# Patient Record
Sex: MALE | Race: WHITE | NOT HISPANIC OR LATINO | Employment: OTHER | URBAN - METROPOLITAN AREA
[De-identification: names, ages, dates, MRNs, and addresses within clinical notes are randomized per-mention and may not be internally consistent; named-entity substitution may affect disease eponyms.]

---

## 2019-11-22 ENCOUNTER — TELEPHONE (OUTPATIENT)
Dept: OPHTHALMOLOGY | Facility: MEDICAL CENTER | Age: 74
End: 2019-11-22

## 2019-12-03 ENCOUNTER — APPOINTMENT (OUTPATIENT)
Dept: OPHTHALMOLOGY | Facility: MEDICAL CENTER | Age: 74
End: 2019-12-03
Payer: MEDICARE

## 2020-02-10 ENCOUNTER — OFFICE VISIT (OUTPATIENT)
Dept: OPHTHALMOLOGY | Facility: MEDICAL CENTER | Age: 75
End: 2020-02-10
Payer: MEDICARE

## 2020-02-10 DIAGNOSIS — H46.8 ISCHEMIC OPTIC NEURITIS OF LEFT EYE: ICD-10-CM

## 2020-02-10 DIAGNOSIS — H35.3123 ADVANCED ATROPHIC NONEXUDATIVE AGE-RELATED MACULAR DEGENERATION OF LEFT EYE WITHOUT SUBFOVEAL INVOLVEMENT: ICD-10-CM

## 2020-02-10 DIAGNOSIS — L57.0 ACTINIC KERATOSIS: ICD-10-CM

## 2020-02-10 DIAGNOSIS — G56.03 BILATERAL CARPAL TUNNEL SYNDROME: ICD-10-CM

## 2020-02-10 PROBLEM — H35.30 MACULAR DEGENERATION OF LEFT EYE: Status: ACTIVE | Noted: 2020-02-10

## 2020-02-10 PROCEDURE — 99204 OFFICE O/P NEW MOD 45 MIN: CPT | Mod: 25 | Performed by: OPHTHALMOLOGY

## 2020-02-10 PROCEDURE — 92250 FUNDUS PHOTOGRAPHY W/I&R: CPT | Performed by: OPHTHALMOLOGY

## 2020-02-10 RX ORDER — CARVEDILOL 12.5 MG/1
12.5 TABLET ORAL 2 TIMES DAILY WITH MEALS
COMMUNITY

## 2020-02-10 RX ORDER — SPIRONOLACTONE 25 MG/1
15 TABLET ORAL DAILY
COMMUNITY

## 2020-02-10 RX ORDER — IPRATROPIUM BROMIDE AND ALBUTEROL SULFATE 2.5; .5 MG/3ML; MG/3ML
3 SOLUTION RESPIRATORY (INHALATION) 4 TIMES DAILY
COMMUNITY

## 2020-02-10 RX ORDER — FUROSEMIDE 40 MG/1
40 TABLET ORAL 2 TIMES DAILY
COMMUNITY

## 2020-02-10 RX ORDER — LOSARTAN POTASSIUM 25 MG/1
25 TABLET ORAL DAILY
COMMUNITY

## 2020-02-10 RX ORDER — PRAVASTATIN SODIUM 40 MG
40 TABLET ORAL NIGHTLY
COMMUNITY

## 2020-02-10 SDOH — HEALTH STABILITY: MENTAL HEALTH: HOW OFTEN DO YOU HAVE A DRINK CONTAINING ALCOHOL?: 2-4 TIMES A MONTH

## 2020-02-10 ASSESSMENT — ENCOUNTER SYMPTOMS
CONSTITUTIONAL NEGATIVE: 1
DOUBLE VISION: 1
GASTROINTESTINAL NEGATIVE: 1
PSYCHIATRIC NEGATIVE: 1
NEUROLOGICAL NEGATIVE: 1
BLURRED VISION: 1

## 2020-02-10 ASSESSMENT — REFRACTION_MANIFEST
OS_SPHERE: PLANO
OD_CYLINDER: +0.75
OS_CYLINDER: +0.50
OS_AXIS: 091
METHOD_AUTOREFRACTION: 1
OD_SPHERE: -0.50
OD_AXIS: 162

## 2020-02-10 ASSESSMENT — VISUAL ACUITY
OS_CC: 20/200
CORRECTION_TYPE: GLASSES
METHOD: SNELLEN - LINEAR
OS_CC: J16
OD_PH_CC: 20/25
OD_CC: 20/30-2
OD_CC: J1+

## 2020-02-10 ASSESSMENT — CONF VISUAL FIELD
OS_INFERIOR_NASAL_RESTRICTION: 1
OS_INFERIOR_TEMPORAL_RESTRICTION: 1
OS_SUPERIOR_TEMPORAL_RESTRICTION: 1
OS_SUPERIOR_NASAL_RESTRICTION: 1
OD_NORMAL: 1

## 2020-02-10 ASSESSMENT — REFRACTION_WEARINGRX
OS_AXIS: 108
OD_CYLINDER: +0.50
OD_AXIS: 175
SPECS_TYPE: BIFOCAL
OS_CYLINDER: +0.50
OS_ADD: +2.50
OD_ADD: +2.50
OD_SPHERE: +0.75
OS_SPHERE: +1.00

## 2020-02-10 ASSESSMENT — SLIT LAMP EXAM - LIDS
COMMENTS: NORMAL
COMMENTS: NORMAL

## 2020-02-10 ASSESSMENT — REFRACTION
OD_CYLINDER: +0.75
OS_SPHERE: PLANO
OS_AXIS: 070
OD_SPHERE: -0.50
OD_AXIS: 170
OS_CYLINDER: +0.50

## 2020-02-10 ASSESSMENT — TONOMETRY
OS_IOP_MMHG: 16
IOP_METHOD: APPLANATION
OD_IOP_MMHG: 17

## 2020-02-10 ASSESSMENT — CUP TO DISC RATIO
OS_RATIO: 0.2
OD_RATIO: 0.3

## 2020-02-10 ASSESSMENT — EXTERNAL EXAM - LEFT EYE: OS_EXAM: NORMAL

## 2020-02-10 ASSESSMENT — EXTERNAL EXAM - RIGHT EYE: OD_EXAM: NORMAL

## 2020-02-10 NOTE — ASSESSMENT & PLAN NOTE
2/10/2020 - Most likely ischemic optic neuropathy given history of disc swelling and now atrophy with decrease NFL thickness to 66 OS. However not typical disc at risk and concern would be another acute process involving the optic nerve that my have lead to some swelling such as an expanding adenoma or aneurysm. No GCA symptoms. Also could be inflammatory / infectious. Will therefore send not to Dr Mckenna in [place] to obtain JOSE, RF, ACE, TPA (FTA-ABS), Bartonella, CBC Metabolic profile as well as MRI of the orbits with idalia. I would like to review the labs and then scans when available so they should send scan on CD as well for my review.  Gave information regarding NAAION to read at home and advised to stop smoking as the major modifiable risk factor.

## 2020-02-10 NOTE — PROGRESS NOTES
Peds/Neuro Ophthalmology:   Ben Mccray M.D.    Date & Time note created:    2/10/2020   4:58 PM     Referring MD / APRN:  Jerardo Mckenna M.D., [place]    Patient ID:  Name:             Derek Delgado   YOB: 1945  Age:                 75 y.o.  male   MRN:               [id number]    Chief Complaint/Reason for Visit:     Diplopia      History of Present Illness:    Derek Delgado is a 75 y.o. male   Referred for decreased vision possible optic nerve left eye since November of 2019.Flashes of light,double vision at times.Shadoe temporal area  Of  Left eye,floaters left.Everything ok with right eye except fluttering blue light only sometime.No eye pain or discharge. Also having weakness and sensitivity in the hands. Sometime during the day. Then saw Dr Sykes  11/19/2019 - Vision was 20/80 OS Noted blurred disc margin OS with elevation. No  MRI scans. Bp has been pretty low. Takes prescription meds at supper time. Still smoking. On xeralto since last April.       Review of Systems:  Review of Systems   Constitutional: Negative.    HENT: Positive for hearing loss.    Eyes: Positive for blurred vision and double vision.        Flashes and floaters   Gastrointestinal: Negative.    Genitourinary: Negative.    Musculoskeletal:        Finger pain   Skin: Negative.    Neurological: Negative.    Endo/Heme/Allergies: Negative.    Psychiatric/Behavioral: Negative.        Past Medical History:   Past Medical History:   Diagnosis Date   • CHF (congestive heart failure) ([facility])    • COPD (chronic obstructive pulmonary disease) ([facility])    • Hyperlipidemia        Past Surgical History:  Past Surgical History:   Procedure Laterality Date   • ROTATOR CUFF REPAIR Left        Current Outpatient Medications:  Current Outpatient Medications   Medication Sig Dispense Refill   • carvedilol (COREG) 12.5 MG Tab Take 12.5 mg by mouth 2 times a day, with meals.     • furosemide (LASIX) 40 MG Tab Take 40 mg by mouth every  day.     • ipratropium-albuterol (DUONEB) 0.5-2.5 (3) MG/3ML nebulizer solution 3 mL by Nebulization route 4 times a day.     • losartan (COZAAR) 25 MG Tab Take 25 mg by mouth every day.     • pravastatin (PRAVACHOL) 40 MG tablet Take 40 mg by mouth every evening.     • spironolactone (ALDACTONE) 25 MG Tab Take 25 mg by mouth every day.     • rivaroxaban (XARELTO) 20 MG Tab tablet Take 20 mg by mouth with dinner.     • fluticasone-salmeterol (ADVAIR) 500-50 MCG/DOSE AEROSOL POWDER, BREATH ACTIVATED Inhale 1 Puff by mouth every 12 hours.       No current facility-administered medications for this visit.        Allergies:  No Known Allergies    Family History:  Family History   Problem Relation Age of Onset   • Stroke Mother    • Glasses Mother    • Cancer Father    • Glasses Father        Social History:  Social History     Socioeconomic History   • Marital status:      Spouse name: Not on file   • Number of children: Not on file   • Years of education: Not on file   • Highest education level: Not on file   Occupational History   • Not on file   Social Needs   • Financial resource strain: Not on file   • Food insecurity:     Worry: Not on file     Inability: Not on file   • Transportation needs:     Medical: Not on file     Non-medical: Not on file   Tobacco Use   • Smoking status: Current Every Day Smoker     Packs/day: 0.00   • Smokeless tobacco: Never Used   Substance and Sexual Activity   • Alcohol use: Yes     Frequency: 2-4 times a month   • Drug use: Not on file   • Sexual activity: Not on file   Lifestyle   • Physical activity:     Days per week: Not on file     Minutes per session: Not on file   • Stress: Not on file   Relationships   • Social connections:     Talks on phone: Not on file     Gets together: Not on file     Attends [employer] service: Not on file     Active member of club or organization: Not on file     Attends meetings of clubs or organizations: Not on file     Relationship status: Not  on file   • Intimate partner violence:     Fear of current or ex partner: Not on file     Emotionally abused: Not on file     Physically abused: Not on file     Forced sexual activity: Not on file   Other Topics Concern   • Not on file   Social History Narrative    74 yo retired          Physical Exam:  Physical Exam    Oriented x 3  Weight/BMI: There is no height or weight on file to calculate BMI.  There were no vitals taken for this visit.    Base Eye Exam     Visual Acuity (Snellen - Linear)       Right Left    Dist cc 20/30-2 20/200    Dist ph cc 20/25     Near cc J1+ J16    Correction:  Glasses          Tonometry (Applanation, 1:59 PM)       Right Left    Pressure 17 16          Pupils       Dark Light APD    Right 4 2     Left 4 3 +3          Visual Fields       Right Left     Full     Restrictions  Total superior temporal, inferior temporal, superior nasal, inferior nasal deficiencies          Extraocular Movement       Right Left     Full, Ortho Full, Ortho          Neuro/Psych     Oriented x3:  Yes    Mood/Affect:  Normal          Dilation     Both eyes:  Tropicamide (MYDRIACYL) 1% ophthalmic solution, Phenylephrine (NEOSYNEPHRINE) ophthalmic solution 2.5% @ 2:19 PM            Additional Tests     Color       Right Left    [place]sh[place]ara 9/9 0/9          Stereo     Fly:  -    Animals:  0/3    Circles:  0/9            Strabismus Exam     Observations:  Ortho    Distance Near Near +3.00DS Near Bifocals                    Slit Lamp and Fundus Exam     External Exam       Right Left    External Normal Normal          Slit Lamp Exam       Right Left    Lids/Lashes Normal Normal    Conjunctiva/Sclera White and quiet White and quiet    Cornea Clear Clear    Anterior Chamber Deep and quiet Deep and quiet    Iris Round and reactive Round and reactive    Lens Clear Clear    Vitreous Normal Normal          Fundus Exam       Right Left    Disc Normal Optic disc atrophy    C/D Ratio 0.3 0.2    Macula Atrophy Geographic  atrophy    Vessels Normal Normal    Periphery Normal Normal            Refraction     Wearing Rx       Sphere Cylinder Axis Add    Right +0.75 +0.50 175 +2.50    Left +1.00 +0.50 108 +2.50    Age:  10yr    Type:  Bifocal          Manifest Refraction (Auto)       Sphere Cylinder Axis    Right -0.50 +0.75 162    Left [place] +0.50 091          Cycloplegic Refraction       Sphere Cylinder Axis    Right -0.50 +0.75 170    Left [place] +0.50 070                Pertinent Lab/Test/Imaging Review:      Assessment and Plan:     Bilateral carpal tunnel syndrome  2/10/2020 - Probable carpal tunnel syndrome leading to the numbness and some weakness in the left hand. Positive Pahlen's test. Recommend referral to ortho hand specialist.    Ischemic optic neuritis of left eye  2/10/2020 - Most likely ischemic optic neuropathy given history of disc swelling and now atrophy with decrease NFL thickness to 66 OS. However not typical disc at risk and concern would be another acute process involving the optic nerve that my have lead to some swelling such as an expanding adenoma or aneurysm. No GCA symptoms. Also could be inflammatory / infectious. Will therefore send not to Dr Mckenna in [place] to obtain JOSE, RF, ACE, TPA (FTA-ABS), Bartonella, CBC Metabolic profile as well as MRI of the orbits with idalia. I would like to review the labs and then scans when available so they should send scan on CD as well for my review.  Gave information regarding NAAION to read at home and advised to stop smoking as the major modifiable risk factor.     Macular degeneration of left eye  2/10/2020 - ARMD OU with  geographic atrophy OS. Recommended AREDS 2. Wife is taking so she is aware of formulation.     Actinic keratosis  2/10/1010 - Multiple actinic keratosis of arms and hands, one area of concern for progression to carcinoma on finger. Thus will ask primary to refer to dermatology        Ben Mccray M.D.

## 2020-02-10 NOTE — ASSESSMENT & PLAN NOTE
2/10/2020 - Probable carpal tunnel syndrome leading to the numbness and some weakness in the left hand. Positive Pahlen's test. Recommend referral to ortho hand specialist.

## 2020-02-11 NOTE — ASSESSMENT & PLAN NOTE
2/10/2020 - ARMD OU with  geographic atrophy OS. Recommended AREDS 2. Wife is taking so she is aware of formulation.

## 2020-02-11 NOTE — ASSESSMENT & PLAN NOTE
2/10/1010 - Multiple actinic keratosis of arms and hands, one area of concern for progression to carcinoma on finger. Thus will ask primary to refer to dermatology

## 2021-03-31 ENCOUNTER — HOSPITAL ENCOUNTER (OUTPATIENT)
Dept: RADIOLOGY | Facility: MEDICAL CENTER | Age: 76
End: 2021-03-31
Payer: MEDICARE

## 2021-04-05 ENCOUNTER — APPOINTMENT (OUTPATIENT)
Dept: RADIOLOGY | Facility: MEDICAL CENTER | Age: 76
DRG: 280 | End: 2021-04-05
Attending: STUDENT IN AN ORGANIZED HEALTH CARE EDUCATION/TRAINING PROGRAM
Payer: MEDICARE

## 2021-04-05 ENCOUNTER — OFFICE VISIT (OUTPATIENT)
Dept: SLEEP MEDICINE | Facility: MEDICAL CENTER | Age: 76
End: 2021-04-05
Payer: MEDICARE

## 2021-04-05 ENCOUNTER — HOSPITAL ENCOUNTER (INPATIENT)
Facility: MEDICAL CENTER | Age: 76
LOS: 3 days | DRG: 280 | End: 2021-04-08
Attending: HOSPITALIST | Admitting: STUDENT IN AN ORGANIZED HEALTH CARE EDUCATION/TRAINING PROGRAM
Payer: MEDICARE

## 2021-04-05 VITALS
HEIGHT: 75 IN | WEIGHT: 301.38 LBS | HEART RATE: 77 BPM | BODY MASS INDEX: 37.47 KG/M2 | OXYGEN SATURATION: 89 % | DIASTOLIC BLOOD PRESSURE: 76 MMHG | SYSTOLIC BLOOD PRESSURE: 116 MMHG

## 2021-04-05 DIAGNOSIS — I50.23 ACUTE ON CHRONIC SYSTOLIC CONGESTIVE HEART FAILURE (HCC): ICD-10-CM

## 2021-04-05 DIAGNOSIS — R04.2 HEMOPTYSIS: ICD-10-CM

## 2021-04-05 DIAGNOSIS — I27.20 PULMONARY HYPERTENSION (HCC): ICD-10-CM

## 2021-04-05 DIAGNOSIS — Z87.09 HISTORY OF COPD: ICD-10-CM

## 2021-04-05 DIAGNOSIS — I50.23 ACUTE ON CHRONIC HFREF (HEART FAILURE WITH REDUCED EJECTION FRACTION) (HCC): ICD-10-CM

## 2021-04-05 PROBLEM — I21.4 NSTEMI (NON-ST ELEVATED MYOCARDIAL INFARCTION) (HCC): Status: ACTIVE | Noted: 2021-04-05

## 2021-04-05 LAB
ALBUMIN SERPL BCP-MCNC: 3.9 G/DL (ref 3.2–4.9)
ALBUMIN/GLOB SERPL: 1.1 G/DL
ALP SERPL-CCNC: 169 U/L (ref 30–99)
ALT SERPL-CCNC: 25 U/L (ref 2–50)
ANION GAP SERPL CALC-SCNC: 9 MMOL/L (ref 7–16)
AST SERPL-CCNC: 29 U/L (ref 12–45)
BASOPHILS # BLD AUTO: 0.3 % (ref 0–1.8)
BASOPHILS # BLD: 0.02 K/UL (ref 0–0.12)
BILIRUB SERPL-MCNC: 0.7 MG/DL (ref 0.1–1.5)
BUN SERPL-MCNC: 36 MG/DL (ref 8–22)
CALCIUM SERPL-MCNC: 9.4 MG/DL (ref 8.5–10.5)
CHLORIDE SERPL-SCNC: 98 MMOL/L (ref 96–112)
CO2 SERPL-SCNC: 32 MMOL/L (ref 20–33)
CREAT SERPL-MCNC: 1.15 MG/DL (ref 0.5–1.4)
EKG IMPRESSION: NORMAL
EOSINOPHIL # BLD AUTO: 0.02 K/UL (ref 0–0.51)
EOSINOPHIL NFR BLD: 0.3 % (ref 0–6.9)
ERYTHROCYTE [DISTWIDTH] IN BLOOD BY AUTOMATED COUNT: 58 FL (ref 35.9–50)
GLOBULIN SER CALC-MCNC: 3.4 G/DL (ref 1.9–3.5)
GLUCOSE SERPL-MCNC: 136 MG/DL (ref 65–99)
HCT VFR BLD AUTO: 41.9 % (ref 42–52)
HGB BLD-MCNC: 12.9 G/DL (ref 14–18)
IMM GRANULOCYTES # BLD AUTO: 0.02 K/UL (ref 0–0.11)
IMM GRANULOCYTES NFR BLD AUTO: 0.3 % (ref 0–0.9)
INR PPP: 1.59 (ref 0.87–1.13)
LACTATE BLD-SCNC: 1.5 MMOL/L (ref 0.5–2)
LACTATE BLD-SCNC: 1.6 MMOL/L (ref 0.5–2)
LACTATE BLD-SCNC: 2 MMOL/L (ref 0.5–2)
LYMPHOCYTES # BLD AUTO: 1.09 K/UL (ref 1–4.8)
LYMPHOCYTES NFR BLD: 14.9 % (ref 22–41)
MAGNESIUM SERPL-MCNC: 2.2 MG/DL (ref 1.5–2.5)
MCH RBC QN AUTO: 31.1 PG (ref 27–33)
MCHC RBC AUTO-ENTMCNC: 30.8 G/DL (ref 33.7–35.3)
MCV RBC AUTO: 101 FL (ref 81.4–97.8)
MONOCYTES # BLD AUTO: 0.35 K/UL (ref 0–0.85)
MONOCYTES NFR BLD AUTO: 4.8 % (ref 0–13.4)
NEUTROPHILS # BLD AUTO: 5.83 K/UL (ref 1.82–7.42)
NEUTROPHILS NFR BLD: 79.4 % (ref 44–72)
NRBC # BLD AUTO: 0 K/UL
NRBC BLD-RTO: 0 /100 WBC
NT-PROBNP SERPL IA-MCNC: 1632 PG/ML (ref 0–125)
PHOSPHATE SERPL-MCNC: 3.7 MG/DL (ref 2.5–4.5)
PLATELET # BLD AUTO: 115 K/UL (ref 164–446)
PMV BLD AUTO: 11.4 FL (ref 9–12.9)
POTASSIUM SERPL-SCNC: 4.6 MMOL/L (ref 3.6–5.5)
PROT SERPL-MCNC: 7.3 G/DL (ref 6–8.2)
PROTHROMBIN TIME: 19.4 SEC (ref 12–14.6)
RBC # BLD AUTO: 4.15 M/UL (ref 4.7–6.1)
SODIUM SERPL-SCNC: 139 MMOL/L (ref 135–145)
TROPONIN T SERPL-MCNC: 101 NG/L (ref 6–19)
TROPONIN T SERPL-MCNC: 106 NG/L (ref 6–19)
TROPONIN T SERPL-MCNC: 107 NG/L (ref 6–19)
WBC # BLD AUTO: 7.3 K/UL (ref 4.8–10.8)

## 2021-04-05 PROCEDURE — 770020 HCHG ROOM/CARE - TELE (206)

## 2021-04-05 PROCEDURE — 84100 ASSAY OF PHOSPHORUS: CPT

## 2021-04-05 PROCEDURE — 700111 HCHG RX REV CODE 636 W/ 250 OVERRIDE (IP): Performed by: STUDENT IN AN ORGANIZED HEALTH CARE EDUCATION/TRAINING PROGRAM

## 2021-04-05 PROCEDURE — 84484 ASSAY OF TROPONIN QUANT: CPT

## 2021-04-05 PROCEDURE — 99204 OFFICE O/P NEW MOD 45 MIN: CPT | Performed by: INTERNAL MEDICINE

## 2021-04-05 PROCEDURE — 85610 PROTHROMBIN TIME: CPT

## 2021-04-05 PROCEDURE — 700102 HCHG RX REV CODE 250 W/ 637 OVERRIDE(OP): Performed by: STUDENT IN AN ORGANIZED HEALTH CARE EDUCATION/TRAINING PROGRAM

## 2021-04-05 PROCEDURE — 71045 X-RAY EXAM CHEST 1 VIEW: CPT

## 2021-04-05 PROCEDURE — 93005 ELECTROCARDIOGRAM TRACING: CPT | Performed by: STUDENT IN AN ORGANIZED HEALTH CARE EDUCATION/TRAINING PROGRAM

## 2021-04-05 PROCEDURE — 85025 COMPLETE CBC W/AUTO DIFF WBC: CPT

## 2021-04-05 PROCEDURE — 94640 AIRWAY INHALATION TREATMENT: CPT

## 2021-04-05 PROCEDURE — 700101 HCHG RX REV CODE 250

## 2021-04-05 PROCEDURE — 51798 US URINE CAPACITY MEASURE: CPT

## 2021-04-05 PROCEDURE — 36415 COLL VENOUS BLD VENIPUNCTURE: CPT

## 2021-04-05 PROCEDURE — 93010 ELECTROCARDIOGRAM REPORT: CPT | Performed by: INTERNAL MEDICINE

## 2021-04-05 PROCEDURE — 94761 N-INVAS EAR/PLS OXIMETRY MLT: CPT | Performed by: INTERNAL MEDICINE

## 2021-04-05 PROCEDURE — 700117 HCHG RX CONTRAST REV CODE 255: Performed by: STUDENT IN AN ORGANIZED HEALTH CARE EDUCATION/TRAINING PROGRAM

## 2021-04-05 PROCEDURE — A9270 NON-COVERED ITEM OR SERVICE: HCPCS | Performed by: STUDENT IN AN ORGANIZED HEALTH CARE EDUCATION/TRAINING PROGRAM

## 2021-04-05 PROCEDURE — 71275 CT ANGIOGRAPHY CHEST: CPT | Mod: ME

## 2021-04-05 PROCEDURE — 83880 ASSAY OF NATRIURETIC PEPTIDE: CPT

## 2021-04-05 PROCEDURE — 80053 COMPREHEN METABOLIC PANEL: CPT

## 2021-04-05 PROCEDURE — 94660 CPAP INITIATION&MGMT: CPT

## 2021-04-05 PROCEDURE — 94760 N-INVAS EAR/PLS OXIMETRY 1: CPT

## 2021-04-05 PROCEDURE — 83605 ASSAY OF LACTIC ACID: CPT | Mod: 91

## 2021-04-05 PROCEDURE — 83735 ASSAY OF MAGNESIUM: CPT

## 2021-04-05 RX ORDER — BUDESONIDE AND FORMOTEROL FUMARATE DIHYDRATE 160; 4.5 UG/1; UG/1
2 AEROSOL RESPIRATORY (INHALATION)
Status: DISCONTINUED | OUTPATIENT
Start: 2021-04-06 | End: 2021-04-08 | Stop reason: HOSPADM

## 2021-04-05 RX ORDER — FUROSEMIDE 10 MG/ML
40 INJECTION INTRAMUSCULAR; INTRAVENOUS ONCE
Status: COMPLETED | OUTPATIENT
Start: 2021-04-05 | End: 2021-04-05

## 2021-04-05 RX ORDER — BISACODYL 10 MG
10 SUPPOSITORY, RECTAL RECTAL
Status: DISCONTINUED | OUTPATIENT
Start: 2021-04-05 | End: 2021-04-08 | Stop reason: HOSPADM

## 2021-04-05 RX ORDER — GUAIFENESIN/DEXTROMETHORPHAN 100-10MG/5
10 SYRUP ORAL EVERY 6 HOURS PRN
Status: DISCONTINUED | OUTPATIENT
Start: 2021-04-05 | End: 2021-04-08 | Stop reason: HOSPADM

## 2021-04-05 RX ORDER — CHOLECALCIFEROL (VITAMIN D3) 125 MCG
10 CAPSULE ORAL
Status: DISCONTINUED | OUTPATIENT
Start: 2021-04-05 | End: 2021-04-08 | Stop reason: HOSPADM

## 2021-04-05 RX ORDER — LOSARTAN POTASSIUM 25 MG/1
25 TABLET ORAL DAILY
Status: DISCONTINUED | OUTPATIENT
Start: 2021-04-05 | End: 2021-04-08 | Stop reason: HOSPADM

## 2021-04-05 RX ORDER — IPRATROPIUM BROMIDE AND ALBUTEROL SULFATE 2.5; .5 MG/3ML; MG/3ML
3 SOLUTION RESPIRATORY (INHALATION) 4 TIMES DAILY
Status: DISCONTINUED | OUTPATIENT
Start: 2021-04-05 | End: 2021-04-06

## 2021-04-05 RX ORDER — ONDANSETRON 2 MG/ML
4 INJECTION INTRAMUSCULAR; INTRAVENOUS EVERY 4 HOURS PRN
Status: DISCONTINUED | OUTPATIENT
Start: 2021-04-05 | End: 2021-04-08 | Stop reason: HOSPADM

## 2021-04-05 RX ORDER — POLYETHYLENE GLYCOL 3350 17 G/17G
1 POWDER, FOR SOLUTION ORAL
Status: DISCONTINUED | OUTPATIENT
Start: 2021-04-05 | End: 2021-04-08 | Stop reason: HOSPADM

## 2021-04-05 RX ORDER — CHOLECALCIFEROL (VITAMIN D3) 125 MCG
10 CAPSULE ORAL NIGHTLY
Status: DISCONTINUED | OUTPATIENT
Start: 2021-04-05 | End: 2021-04-05

## 2021-04-05 RX ORDER — PRAVASTATIN SODIUM 20 MG
40 TABLET ORAL NIGHTLY
Status: DISCONTINUED | OUTPATIENT
Start: 2021-04-05 | End: 2021-04-08 | Stop reason: HOSPADM

## 2021-04-05 RX ORDER — AMOXICILLIN 250 MG
2 CAPSULE ORAL 2 TIMES DAILY
Status: DISCONTINUED | OUTPATIENT
Start: 2021-04-05 | End: 2021-04-08 | Stop reason: HOSPADM

## 2021-04-05 RX ORDER — IPRATROPIUM BROMIDE AND ALBUTEROL SULFATE 2.5; .5 MG/3ML; MG/3ML
SOLUTION RESPIRATORY (INHALATION)
Status: COMPLETED
Start: 2021-04-05 | End: 2021-04-05

## 2021-04-05 RX ORDER — CARVEDILOL 12.5 MG/1
12.5 TABLET ORAL 2 TIMES DAILY WITH MEALS
Status: DISCONTINUED | OUTPATIENT
Start: 2021-04-05 | End: 2021-04-08 | Stop reason: HOSPADM

## 2021-04-05 RX ORDER — SPIRONOLACTONE 25 MG/1
15 TABLET ORAL DAILY
Status: DISCONTINUED | OUTPATIENT
Start: 2021-04-05 | End: 2021-04-08 | Stop reason: HOSPADM

## 2021-04-05 RX ORDER — LABETALOL HYDROCHLORIDE 5 MG/ML
10 INJECTION, SOLUTION INTRAVENOUS EVERY 4 HOURS PRN
Status: DISCONTINUED | OUTPATIENT
Start: 2021-04-05 | End: 2021-04-08 | Stop reason: HOSPADM

## 2021-04-05 RX ORDER — ONDANSETRON 4 MG/1
4 TABLET, ORALLY DISINTEGRATING ORAL EVERY 4 HOURS PRN
Status: DISCONTINUED | OUTPATIENT
Start: 2021-04-05 | End: 2021-04-08 | Stop reason: HOSPADM

## 2021-04-05 RX ORDER — IPRATROPIUM BROMIDE AND ALBUTEROL SULFATE 2.5; .5 MG/3ML; MG/3ML
3 SOLUTION RESPIRATORY (INHALATION) 4 TIMES DAILY
Status: DISCONTINUED | OUTPATIENT
Start: 2021-04-05 | End: 2021-04-05

## 2021-04-05 RX ORDER — ACETAMINOPHEN 325 MG/1
650 TABLET ORAL EVERY 6 HOURS PRN
Status: DISCONTINUED | OUTPATIENT
Start: 2021-04-05 | End: 2021-04-08 | Stop reason: HOSPADM

## 2021-04-05 RX ADMIN — IPRATROPIUM BROMIDE AND ALBUTEROL SULFATE 3 ML: .5; 2.5 SOLUTION RESPIRATORY (INHALATION) at 19:03

## 2021-04-05 RX ADMIN — Medication 10 MG: at 22:57

## 2021-04-05 RX ADMIN — CARVEDILOL 12.5 MG: 12.5 TABLET, FILM COATED ORAL at 17:17

## 2021-04-05 RX ADMIN — FUROSEMIDE 40 MG: 10 INJECTION, SOLUTION INTRAMUSCULAR; INTRAVENOUS at 17:18

## 2021-04-05 RX ADMIN — IOHEXOL 100 ML: 350 INJECTION, SOLUTION INTRAVENOUS at 17:46

## 2021-04-05 RX ADMIN — PRAVASTATIN SODIUM 40 MG: 20 TABLET ORAL at 22:57

## 2021-04-05 RX ADMIN — DOCUSATE SODIUM 50 MG AND SENNOSIDES 8.6 MG 2 TABLET: 8.6; 5 TABLET, FILM COATED ORAL at 17:18

## 2021-04-05 ASSESSMENT — LIFESTYLE VARIABLES
EVER HAD A DRINK FIRST THING IN THE MORNING TO STEADY YOUR NERVES TO GET RID OF A HANGOVER: NO
AVERAGE NUMBER OF DAYS PER WEEK YOU HAVE A DRINK CONTAINING ALCOHOL: 1
HOW MANY TIMES IN THE PAST YEAR HAVE YOU HAD 5 OR MORE DRINKS IN A DAY: 0
TOTAL SCORE: 0
HAVE YOU EVER FELT YOU SHOULD CUT DOWN ON YOUR DRINKING: NO
HAVE PEOPLE ANNOYED YOU BY CRITICIZING YOUR DRINKING: NO
EVER FELT BAD OR GUILTY ABOUT YOUR DRINKING: NO
TOTAL SCORE: 0
ON A TYPICAL DAY WHEN YOU DRINK ALCOHOL HOW MANY DRINKS DO YOU HAVE: 0
ALCOHOL_USE: YES
TOTAL SCORE: 0
CONSUMPTION TOTAL: NEGATIVE
DOES PATIENT WANT TO STOP DRINKING: NO

## 2021-04-05 ASSESSMENT — ENCOUNTER SYMPTOMS
PSYCHIATRIC NEGATIVE: 1
CHILLS: 0
SPUTUM PRODUCTION: 1
WEAKNESS: 1
HEMOPTYSIS: 1
GASTROINTESTINAL NEGATIVE: 1
COUGH: 1
NEUROLOGICAL NEGATIVE: 1
COUGH: 1
HEMOPTYSIS: 1
SPUTUM PRODUCTION: 1
GASTROINTESTINAL NEGATIVE: 1
EYES NEGATIVE: 1
FEVER: 0
MUSCULOSKELETAL NEGATIVE: 1
PSYCHIATRIC NEGATIVE: 1
SHORTNESS OF BREATH: 1
MUSCULOSKELETAL NEGATIVE: 1
ORTHOPNEA: 1
EYES NEGATIVE: 1
SHORTNESS OF BREATH: 1
PALPITATIONS: 1

## 2021-04-05 ASSESSMENT — COGNITIVE AND FUNCTIONAL STATUS - GENERAL
SUGGESTED CMS G CODE MODIFIER MOBILITY: CH
MOBILITY SCORE: 24
DAILY ACTIVITIY SCORE: 24
SUGGESTED CMS G CODE MODIFIER DAILY ACTIVITY: CH

## 2021-04-05 ASSESSMENT — CHA2DS2 SCORE
CHA2DS2 VASC SCORE: 5
AGE 75 OR GREATER: YES
SEX: MALE
DIABETES: NO
CHF OR LEFT VENTRICULAR DYSFUNCTION: YES
HYPERTENSION: YES
AGE 65 TO 74: NO
VASCULAR DISEASE: YES
PRIOR STROKE OR TIA OR THROMBOEMBOLISM: NO

## 2021-04-05 ASSESSMENT — PATIENT HEALTH QUESTIONNAIRE - PHQ9
1. LITTLE INTEREST OR PLEASURE IN DOING THINGS: NOT AT ALL
2. FEELING DOWN, DEPRESSED, IRRITABLE, OR HOPELESS: NOT AT ALL
SUM OF ALL RESPONSES TO PHQ9 QUESTIONS 1 AND 2: 0

## 2021-04-05 NOTE — PROGRESS NOTES
Vasile from Lab called with critical result of troponin 106 at 1345. Critical lab result read back to Vasile.   Dr. Molina notified of critical lab result at 1400.  Critical lab result read back by Kate. .

## 2021-04-05 NOTE — PROGRESS NOTES
TRIAGE OFFICER ADMISSION ACCEPTANCE NOTE:     - I spoke and discussed the case with the physician, Dr. Meyers (Pulmonary) at her clinic   - This is a 36-year-old male with a past medical history of systolic heart failure EF 35%, COPD, pulmonary hypertension, atrial fibrillation, YOSVANY who comes to Dr. Meyers office with shortness of breath.  Associated with 3+ pitting edema, orthopnea and PND.  He does have a cough with clear sputum.  He chronically uses 4 L of oxygen and is now requiring 8 L of oxygen.  He recently started having hemoptysis secondary to Xarelto.  CT scan was completed that did not show a mass.  Apparently patient does have a low sodium diet and was compliant with medications.  Currently denies chest pains.  He will be directly admitted for decompensated heart failure.  Dr. Meyers will be on consult for this patient.    - Please call admitting physician for full admission orders, and cross-coverage issues on patient's arrival to the unit.

## 2021-04-05 NOTE — ASSESSMENT & PLAN NOTE
In acute exacerbation  Diffuse crackles + EDGAR 3 + edema  Hypoxic on 8 l up tp 87%  My assessment with the severity of the hypoxemia and his acute CHF exacerbation do think he needs admission  Pt worried about costs   He understand the severity of the CHF exacerbation and understands he needs admission

## 2021-04-05 NOTE — PROGRESS NOTES
Vasile from Lab called with critical result of troponin  at 106. Critical lab result read back to Vasile.   Dr. Ayon notified of critical lab result at 1400 .  Critical lab result read back by Dr. Ayon.

## 2021-04-05 NOTE — ASSESSMENT & PLAN NOTE
Patient presenting with dyspnea on exertion, orthopnea and substernal chest pain  Fluid overload on physical exam  Chest x-ray showing interstitial edema as well as small right pleural effusion  BNP in the 1600s  2D echo: EF 50%, moderately dilated RV, PASP 65  BID Lasix - monitor lytes  Net negative 2.4L

## 2021-04-05 NOTE — PROGRESS NOTES
Pulmonary Consultation    Date of Service: 4/5/2021    Consulting Physician: Jerardo Mckenna M.D.    Reason for consult:  Establish Care (Referred by Dr Bala Kurtz for  COPD/Pleural effusion, Pulm. HTN, 02 dependent) and Other (CXR 03/04/12, CT-CTA Chest 03/04/21, ECHO 03/04/2021, Labs 03/04/21)      Problem List Items Addressed This Visit     Acute on chronic systolic congestive heart failure (HCC)     In acute exacerbation  Diffuse crackles + EDGAR 3 + edema  Hypoxic on 8 l up tp 87%  My assessment with the severity of the hypoxemia and his acute CHF exacerbation do think he needs admission  Pt worried about costs   He understand the severity of the CHF exacerbation and understands he needs admission         Relevant Orders    REFERRAL TO CARDIOLOGY    Pulmonary hypertension ([facility])    Relevant Orders    PULMONARY FUNCTION TESTS -Test requested: Complete Pulmonary Function Test    Multiple Oximetry    Hemoptysis     At this time, he is in acute exacerbation of CHF and likely the cause of his hemoptysis         Relevant Orders    Bronchoscopy    History of COPD     Unknown severity  Still actively smoking                 History of Present Illness: Derek Delgado is a 76 y.o. male with a past medical history of cardiomyopathy with CHF EF 35-45% with LA enlargement with pulmonary hypertension RVSP 55 mmhg, COPD, YOSVANY dx 10 years ago on CPAP with O2 4 l, atrial fibrillation was on xarelto and stopped due to hemoptysis but restarted last night and coughed up blood again  Pt still smokes 1/2 pack a day    3/4/21: Ct chest done reviewed personally and shows calcification of the main bronchi, no nodules no blatant emphysema    On advair, harshaangel uses it 4-6 times a day and on 4 l NC qday  On multiox walk he is unable to keep sats up until 8 liters,  + cough - sputum clear or green  No recent steroids and Abx  3/4/21 is when he coughed up blood  This is new condition the hemoptysis as he has been on xarelto for one  year    Leg swelling markedly worse and + weight  Difficulty walking any steps    Exercise tolerance:  Walking distance: minimal distance  [place]: unable only able to climb 3 steps    Night time symptoms: uses CPAP    Pulmonary History:    Environmental exposures: no hot tub; no woodstove, + mining work gold, silver and copper, and no asbestos exposure    Farm work in [place]  Pets:    Occupation History:   Family history of pulmonary disease:  Unknown   Second hand smoke exposure: yes    Per pt Rx for TB in 1979    Review of Systems   Constitutional: Positive for malaise/fatigue.   HENT: Negative.    Eyes: Negative.    Respiratory: Positive for cough, hemoptysis, sputum production and shortness of breath.    Cardiovascular: Positive for leg swelling.   Gastrointestinal: Negative.    Genitourinary: Negative.    Musculoskeletal: Negative.    Skin: Negative.    Neurological: Positive for weakness.   Endo/Heme/Allergies: Negative.    Psychiatric/Behavioral: Negative.        Current Outpatient Medications on File Prior to Visit   Medication Sig Dispense Refill   • carvedilol (COREG) 12.5 MG Tab Take 12.5 mg by mouth 2 times a day, with meals.     • furosemide (LASIX) 40 MG Tab Take 40 mg by mouth every day.     • ipratropium-albuterol (DUONEB) 0.5-2.5 (3) MG/3ML nebulizer solution 3 mL by Nebulization route 4 times a day.     • losartan (COZAAR) 25 MG Tab Take 25 mg by mouth every day.     • pravastatin (PRAVACHOL) 40 MG tablet Take 40 mg by mouth every evening.     • spironolactone (ALDACTONE) 25 MG Tab Take 25 mg by mouth every day.     • rivaroxaban (XARELTO) 20 MG Tab tablet Take 15 mg by mouth with dinner.     • fluticasone-salmeterol (ADVAIR) 500-50 MCG/DOSE AEROSOL POWDER, BREATH ACTIVATED Inhale 1 Puff by mouth every 12 hours.       No current facility-administered medications on file prior to visit.       Social History     Tobacco Use   • Smoking status: Current Every Day Smoker     Packs/day:  "1.00     Years: 40.00     Pack years: 40.00     Types: Cigarettes     Start date: 1979   • Smokeless tobacco: Never Used   Substance Use Topics   • Alcohol use: Yes   • Drug use: Not on file        Past Medical History:   Diagnosis Date   • Chest tightness    • CHF (congestive heart failure) ([facility])    • COPD (chronic obstructive pulmonary disease) (HCC)    • Cough    • Difficulty breathing    • Hyperlipidemia    • Palpitations    • Shortness of breath    • Sputum production    • Swelling of lower extremity    • Wheezing        Past Surgical History:   Procedure Laterality Date   • ROTATOR CUFF REPAIR Left        Allergies: Patient has no known allergies.    Family History   Problem Relation Age of Onset   • Stroke Mother    • Glasses Mother    • Cancer Father    • Glasses Father        Vitals:    04/05/21 0836 04/05/21 0840   Height: 1.905 m (6' 3\")    Weight: (!) 137 kg (301 lb 6 oz)    Weight % change since last entry.: 0 %    BP: 116/76    Pulse: 77    BMI (Calculated): 37.67    O2 sat % room air:  (!) 85 %   /70      Physical Examination  Physical Exam   Constitutional: He is oriented to person, place, and time. No distress.   Ill appearing  Breathing heavy  Able to complete his sentences   HENT:   Head: Normocephalic and atraumatic.   Right Ear: External ear normal.   Left Ear: External ear normal.   Nose: Nose normal.   Mouth/Throat: Oropharynx is clear and moist. No oropharyngeal exudate.   Eyes: Pupils are equal, round, and reactive to light. Conjunctivae and EOM are normal.   Neck: JVD present. No tracheal deviation present. No thyromegaly present.   Cardiovascular: Normal rate, regular rhythm, normal heart sounds and intact distal pulses. Exam reveals no gallop and no friction rub.   No murmur heard.  Pulmonary/Chest: Effort normal and breath sounds normal. No stridor.   Crackles b/l throughout   Abdominal: Soft. Bowel sounds are normal. He exhibits no distension and no mass.   Musculoskeletal:        "  General: Edema present. No deformity. Normal range of motion.      Cervical back: Normal range of motion and neck supple.   Lymphadenopathy:     He has no cervical adenopathy.   Neurological: He is alert and oriented to person, place, and time.   Skin: No rash noted. He is not diaphoretic. There is erythema.   Venous stasis in the lower extremities   Psychiatric: Mood, memory, affect and judgment normal.        Jama Esquivel M.D., MD [facility] [place]  [facility]

## 2021-04-05 NOTE — ASSESSMENT & PLAN NOTE
Correlated anticoagulation use  Recurrent hemoptysis with resumption of xarelto  CTA negative for PE  Will resume xarelto with close monitoring    HASBLED: 2  [id number]: 2

## 2021-04-05 NOTE — H&P
Hospital Medicine History & Physical Note    Date of Service  4/5/2021    Primary Care Physician  Jerardo Mckenna M.D.    Consultants  Cardiology    Code Status  Full Code    Chief Complaint  Hemoptysis    History of Presenting Illness  76 y.o. male with a poast medical history of COPD on 4 L nasal cannula baseline, YOSVANY on CPAP, atrial fibrillation on Xarelto, and CHF last echocardiogram on 3/2021 with EF 35%, LA enlargement, and pulmonary hypertension with RVSP 55 presented to his outpatient pulmonology clinic on 4/4/2021 with a 3-week history of hemoptysis along with dyspnea on exertion and substernal chest pain on exertion.  Additionally, patient reports that he stopped Xarelto on his home and restarted yesterday for which hemoptysis restarted.    At the telemetry floor, vital signs with tachypnea in the 20s.  Patient saturating well on home 4 L.  CBC with microcytic anemia and elevated RDW, no leukocytosis but elevated polys.  Chemistry without gross abnormalities.  EKG w/RBBB. BNP in the 1600s and troponin 106. LA 2.  Chest x-ray showing cardiomegaly and interstitial edema as well as small right pleural effusion.  Patient was admitted for congestive heart failure exacerbation work-up and management.    Review of Systems  Review of Systems   Constitutional: Positive for malaise/fatigue. Negative for chills and fever.   HENT: Negative.    Eyes: Negative.    Respiratory: Positive for cough, hemoptysis, sputum production and shortness of breath.    Cardiovascular: Positive for chest pain, palpitations, orthopnea and leg swelling.   Gastrointestinal: Negative.    Genitourinary: Positive for dysuria.   Musculoskeletal: Negative.    Skin: Negative.    Neurological: Negative.    Endo/Heme/Allergies: Negative.    Psychiatric/Behavioral: Negative.        Past Medical History   has a past medical history of Chest tightness, CHF (congestive heart failure) (HCC), COPD (chronic obstructive pulmonary disease) (HCC), Cough,  Difficulty breathing, Hyperlipidemia, Palpitations, Shortness of breath, Sputum production, Swelling of lower extremity, and Wheezing.    Surgical History   has a past surgical history that includes rotator cuff repair (Left).     Family History  family history includes Cancer in his father; Glasses in his father and mother; Stroke in his mother.     Social History   reports that he has been smoking cigarettes. He started smoking about 42 years ago. He has a 40.00 pack-year smoking history. He has never used smokeless tobacco. He reports current alcohol use.    Allergies  No Known Allergies    Medications  Prior to Admission Medications   Prescriptions Last Dose Informant Patient Reported? Taking?   carvedilol (COREG) 12.5 MG Tab   Yes No   Sig: Take 12.5 mg by mouth 2 times a day, with meals.   fluticasone-salmeterol (ADVAIR) 500-50 MCG/DOSE AEROSOL POWDER, BREATH ACTIVATED   Yes No   Sig: Inhale 1 Puff by mouth every 12 hours.   furosemide (LASIX) 40 MG Tab   Yes No   Sig: Take 40 mg by mouth every day.   ipratropium-albuterol (DUONEB) 0.5-2.5 (3) MG/3ML nebulizer solution   Yes No   Sig: 3 mL by Nebulization route 4 times a day.   losartan (COZAAR) 25 MG Tab   Yes No   Sig: Take 25 mg by mouth every day.   pravastatin (PRAVACHOL) 40 MG tablet   Yes No   Sig: Take 40 mg by mouth every evening.   rivaroxaban (XARELTO) 20 MG Tab tablet   Yes No   Sig: Take 15 mg by mouth with dinner.   spironolactone (ALDACTONE) 25 MG Tab   Yes No   Sig: Take 25 mg by mouth every day.      Facility-Administered Medications: None       Physical Exam  Temp:  [36.4 °C (97.6 °F)] 36.4 °C (97.6 °F)  Pulse:  [72] 72  Resp:  [20] 20  BP: (96)/(62) 96/62  SpO2:  [91 %] 91 %    Physical Exam  Constitutional:       General: He is not in acute distress.     Appearance: Normal appearance. He is obese. He is ill-appearing. He is not toxic-appearing or diaphoretic.   HENT:      Head: Normocephalic and atraumatic.      Nose: Nose normal. No  congestion.      Mouth/Throat:      Mouth: Mucous membranes are moist.   Eyes:      Extraocular Movements: Extraocular movements intact.      Pupils: Pupils are equal, round, and reactive to light.   Cardiovascular:      Rate and Rhythm: Normal rate. Rhythm irregular.      Pulses: Normal pulses.      Heart sounds: Normal heart sounds.      Comments: +JVD  Pulmonary:      Effort: Respiratory distress present.      Breath sounds: Rales present.   Abdominal:      General: Bowel sounds are normal. There is distension.      Palpations: Abdomen is soft.      Tenderness: There is no abdominal tenderness. There is no guarding or rebound.   Musculoskeletal:         General: Swelling and deformity present. Normal range of motion.      Cervical back: Normal range of motion and neck supple.      Right lower leg: Edema present.      Left lower leg: Edema present.      Comments: 3+ LE edema   Skin:     General: Skin is warm.      Coloration: Skin is not jaundiced.   Neurological:      General: No focal deficit present.      Mental Status: He is alert and oriented to person, place, and time. Mental status is at baseline.      Cranial Nerves: No cranial nerve deficit.   Psychiatric:         Mood and Affect: Mood normal.         Behavior: Behavior normal.         Thought Content: Thought content normal.         Judgment: Judgment normal.         Laboratory:          No results for input(s): ALTSGPT, ASTSGOT, ALKPHOSPHAT, TBILIRUBIN, DBILIRUBIN, GAMMAGT, AMYLASE, LIPASE, ALB, PREALBUMIN, GLUCOSE in the last 72 hours.      No results for input(s): NTPROBNP in the last 72 hours.      No results for input(s): TROPONINT in the last 72 hours.    Imaging:  EC-ECHOCARDIOGRAM COMPLETE W/ CONT    (Results Pending)         Assessment/Plan:  I anticipate this patient will require at least two midnights for appropriate medical management, necessitating inpatient admission.    NSTEMI (non-ST elevated myocardial infarction) (HCC)- (present on  admission)  Assessment & Plan  Reporting a 1 month history of episodic oppressive substernal chest pain that worsens with exertion and improves with rest, also dyspnea  EKG with right bundle branch block, unable to determine ischemia secondary to artifact  Troponin at 106  Likely secondary to demand in the setting of CHF, ACS not ruled out  Admit to telemetry  Repeat EKG  Trend troponins   Continue home beta-blocker therapy and high-dose statin  Hold off on anticoagulation in the setting of active hemoptysis  Consult cardiology, appreciate recommendations    Acute on chronic HFrEF (heart failure with reduced ejection fraction) (HCC)- (present on admission)  Assessment & Plan  Patient presenting with dyspnea on exertion, orthopnea and substernal chest pain  Fluid overload on physical exam  Chest x-ray showing interstitial edema as well as small right pleural effusion  BNP in the 1600s  Admit to telemetry  Cardiac diet  Fluid restriction  I/O  Daily weights  Diuresis with Lasix  Echocardiogram  Cardiology following, patient recommendations    Hemoptysis- (present on admission)  Assessment & Plan  Correlated anticoagulation use  Worsening heart failure, acute respiratory failure, elevation in troponins and hemoptysis concerning for possible PE  Chest CTA  Hold anticoagulation for now  Resolved    Pulmonary hypertension (HCC)- (present on admission)  Assessment & Plan  Last echocardiogram on 3/2021 with RVSP 55  Secondary to cor pulmonale from chronic COPD/smoking  Patient still smoking  Fluid restriction  I/O  Daily weights  Diuresis  Repeat echo  Pulmonology follow-up, appreciate recommendations    History of COPD- (present on admission)  Assessment & Plan  Oxygen requirements back to baseline  RT/O2  Continue home inhalers    DVT prophylaxis SCDs in the setting of hemoptysis

## 2021-04-05 NOTE — ASSESSMENT & PLAN NOTE
Last echocardiogram on 3/2021 with RVSP 55  Secondary to cor pulmonale from chronic COPD/smoking  Patient still smoking  2D echo: EF 50%, moderately dilated RV, PASP 65  Pulmonology recommendation noted

## 2021-04-05 NOTE — PROCEDURES
Multi-Ox Readings  Multi Ox #1 2 LPM   O2 sat % at rest 96   O2 sat % on exertion 85   O2 sat average on exertion     Multi Ox #2 8 LPM   O2 sat % at rest 95   O2 sat % on exertion 88   O2 sat average on exertion       Oxygen Use     Oxygen Frequency With exertion and nocturnal   Duration of need     Is the patient mobile within the home?     CPAP Use? 4   BIPAP Use?     Servo Titration

## 2021-04-05 NOTE — ASSESSMENT & PLAN NOTE
Likely secondary to demand in the setting of his known atrial fibrillation and pulmonary hypertension.  No chest pain  Troponin peak 117  Cardiology consuluted

## 2021-04-05 NOTE — PROGRESS NOTES
Patient arrived to [id number] Transferred from wheelchair bed by walking. Pt assessed, A&Ox4, Vitals stable, pt denies pain. Mild SOB on 4L 02 via NC noted.   Pt updated on plan of care, Call light within reach, personal belongings within reach.  Bed in lowest position.  Pt ambulates via upself. Tele monitor on and monitor room notified, rhythm is Afib 95.  Will continue to monitor. Hourly rounding in place.

## 2021-04-06 ENCOUNTER — APPOINTMENT (OUTPATIENT)
Dept: RADIOLOGY | Facility: MEDICAL CENTER | Age: 76
DRG: 280 | End: 2021-04-06
Attending: INTERNAL MEDICINE
Payer: MEDICARE

## 2021-04-06 ENCOUNTER — PATIENT OUTREACH (OUTPATIENT)
Dept: HEALTH INFORMATION MANAGEMENT | Facility: OTHER | Age: 76
End: 2021-04-06

## 2021-04-06 ENCOUNTER — APPOINTMENT (OUTPATIENT)
Dept: CARDIOLOGY | Facility: MEDICAL CENTER | Age: 76
DRG: 280 | End: 2021-04-06
Attending: HOSPITALIST
Payer: MEDICARE

## 2021-04-06 PROBLEM — I48.91 ATRIAL FIBRILLATION (HCC): Status: ACTIVE | Noted: 2021-04-06

## 2021-04-06 LAB
ALBUMIN SERPL BCP-MCNC: 3.8 G/DL (ref 3.2–4.9)
ALBUMIN/GLOB SERPL: 1.2 G/DL
ALP SERPL-CCNC: 164 U/L (ref 30–99)
ALT SERPL-CCNC: 26 U/L (ref 2–50)
ANION GAP SERPL CALC-SCNC: 4 MMOL/L (ref 7–16)
AST SERPL-CCNC: 31 U/L (ref 12–45)
BASOPHILS # BLD AUTO: 0.4 % (ref 0–1.8)
BASOPHILS # BLD: 0.03 K/UL (ref 0–0.12)
BILIRUB SERPL-MCNC: 0.6 MG/DL (ref 0.1–1.5)
BUN SERPL-MCNC: 40 MG/DL (ref 8–22)
CALCIUM SERPL-MCNC: 9.3 MG/DL (ref 8.5–10.5)
CHLORIDE SERPL-SCNC: 97 MMOL/L (ref 96–112)
CO2 SERPL-SCNC: 35 MMOL/L (ref 20–33)
CREAT SERPL-MCNC: 1.14 MG/DL (ref 0.5–1.4)
EOSINOPHIL # BLD AUTO: 0.14 K/UL (ref 0–0.51)
EOSINOPHIL NFR BLD: 1.9 % (ref 0–6.9)
ERYTHROCYTE [DISTWIDTH] IN BLOOD BY AUTOMATED COUNT: 58.1 FL (ref 35.9–50)
GLOBULIN SER CALC-MCNC: 3.3 G/DL (ref 1.9–3.5)
GLUCOSE SERPL-MCNC: 122 MG/DL (ref 65–99)
HCT VFR BLD AUTO: 41.5 % (ref 42–52)
HGB BLD-MCNC: 12.6 G/DL (ref 14–18)
IMM GRANULOCYTES # BLD AUTO: 0.01 K/UL (ref 0–0.11)
IMM GRANULOCYTES NFR BLD AUTO: 0.1 % (ref 0–0.9)
LACTATE BLD-SCNC: 1.4 MMOL/L (ref 0.5–2)
LV EJECT FRACT  99904: 50
LV EJECT FRACT MOD 2C 99903: 54.81
LV EJECT FRACT MOD 4C 99902: 58.26
LV EJECT FRACT MOD BP 99901: 59.32
LYMPHOCYTES # BLD AUTO: 1.73 K/UL (ref 1–4.8)
LYMPHOCYTES NFR BLD: 23.5 % (ref 22–41)
MAGNESIUM SERPL-MCNC: 2.3 MG/DL (ref 1.5–2.5)
MCH RBC QN AUTO: 30.7 PG (ref 27–33)
MCHC RBC AUTO-ENTMCNC: 30.4 G/DL (ref 33.7–35.3)
MCV RBC AUTO: 101.2 FL (ref 81.4–97.8)
MONOCYTES # BLD AUTO: 0.71 K/UL (ref 0–0.85)
MONOCYTES NFR BLD AUTO: 9.6 % (ref 0–13.4)
NEUTROPHILS # BLD AUTO: 4.74 K/UL (ref 1.82–7.42)
NEUTROPHILS NFR BLD: 64.5 % (ref 44–72)
NRBC # BLD AUTO: 0 K/UL
NRBC BLD-RTO: 0 /100 WBC
PHOSPHATE SERPL-MCNC: 4.3 MG/DL (ref 2.5–4.5)
PLATELET # BLD AUTO: 120 K/UL (ref 164–446)
PMV BLD AUTO: 11.6 FL (ref 9–12.9)
POTASSIUM SERPL-SCNC: 4.1 MMOL/L (ref 3.6–5.5)
PROT SERPL-MCNC: 7.1 G/DL (ref 6–8.2)
RBC # BLD AUTO: 4.1 M/UL (ref 4.7–6.1)
SODIUM SERPL-SCNC: 136 MMOL/L (ref 135–145)
TROPONIN T SERPL-MCNC: 105 NG/L (ref 6–19)
TROPONIN T SERPL-MCNC: 114 NG/L (ref 6–19)
TROPONIN T SERPL-MCNC: 117 NG/L (ref 6–19)
WBC # BLD AUTO: 7.4 K/UL (ref 4.8–10.8)

## 2021-04-06 PROCEDURE — 99223 1ST HOSP IP/OBS HIGH 75: CPT | Mod: GC | Performed by: INTERNAL MEDICINE

## 2021-04-06 PROCEDURE — 99232 SBSQ HOSP IP/OBS MODERATE 35: CPT | Performed by: INTERNAL MEDICINE

## 2021-04-06 PROCEDURE — 85025 COMPLETE CBC W/AUTO DIFF WBC: CPT

## 2021-04-06 PROCEDURE — 93970 EXTREMITY STUDY: CPT

## 2021-04-06 PROCEDURE — 93306 TTE W/DOPPLER COMPLETE: CPT | Mod: 26 | Performed by: INTERNAL MEDICINE

## 2021-04-06 PROCEDURE — 83735 ASSAY OF MAGNESIUM: CPT

## 2021-04-06 PROCEDURE — 84100 ASSAY OF PHOSPHORUS: CPT

## 2021-04-06 PROCEDURE — 94760 N-INVAS EAR/PLS OXIMETRY 1: CPT

## 2021-04-06 PROCEDURE — A9270 NON-COVERED ITEM OR SERVICE: HCPCS | Performed by: STUDENT IN AN ORGANIZED HEALTH CARE EDUCATION/TRAINING PROGRAM

## 2021-04-06 PROCEDURE — 99406 BEHAV CHNG SMOKING 3-10 MIN: CPT

## 2021-04-06 PROCEDURE — 94640 AIRWAY INHALATION TREATMENT: CPT

## 2021-04-06 PROCEDURE — 94664 DEMO&/EVAL PT USE INHALER: CPT

## 2021-04-06 PROCEDURE — 83605 ASSAY OF LACTIC ACID: CPT

## 2021-04-06 PROCEDURE — 36415 COLL VENOUS BLD VENIPUNCTURE: CPT

## 2021-04-06 PROCEDURE — 80053 COMPREHEN METABOLIC PANEL: CPT

## 2021-04-06 PROCEDURE — 94660 CPAP INITIATION&MGMT: CPT

## 2021-04-06 PROCEDURE — 700111 HCHG RX REV CODE 636 W/ 250 OVERRIDE (IP): Performed by: INTERNAL MEDICINE

## 2021-04-06 PROCEDURE — 700102 HCHG RX REV CODE 250 W/ 637 OVERRIDE(OP): Performed by: STUDENT IN AN ORGANIZED HEALTH CARE EDUCATION/TRAINING PROGRAM

## 2021-04-06 PROCEDURE — 770020 HCHG ROOM/CARE - TELE (206)

## 2021-04-06 PROCEDURE — 93306 TTE W/DOPPLER COMPLETE: CPT

## 2021-04-06 PROCEDURE — 99222 1ST HOSP IP/OBS MODERATE 55: CPT | Performed by: INTERNAL MEDICINE

## 2021-04-06 PROCEDURE — 700101 HCHG RX REV CODE 250: Performed by: HOSPITALIST

## 2021-04-06 PROCEDURE — 84484 ASSAY OF TROPONIN QUANT: CPT | Mod: 91

## 2021-04-06 RX ORDER — IPRATROPIUM BROMIDE AND ALBUTEROL SULFATE 2.5; .5 MG/3ML; MG/3ML
3 SOLUTION RESPIRATORY (INHALATION)
Status: DISCONTINUED | OUTPATIENT
Start: 2021-04-06 | End: 2021-04-08 | Stop reason: HOSPADM

## 2021-04-06 RX ORDER — FUROSEMIDE 10 MG/ML
40 INJECTION INTRAMUSCULAR; INTRAVENOUS
Status: DISPENSED | OUTPATIENT
Start: 2021-04-06 | End: 2021-04-08

## 2021-04-06 RX ADMIN — IPRATROPIUM BROMIDE AND ALBUTEROL SULFATE 3 ML: .5; 2.5 SOLUTION RESPIRATORY (INHALATION) at 11:45

## 2021-04-06 RX ADMIN — IPRATROPIUM BROMIDE AND ALBUTEROL SULFATE 3 ML: .5; 2.5 SOLUTION RESPIRATORY (INHALATION) at 05:04

## 2021-04-06 RX ADMIN — CARVEDILOL 12.5 MG: 12.5 TABLET, FILM COATED ORAL at 08:06

## 2021-04-06 RX ADMIN — BUDESONIDE AND FORMOTEROL FUMARATE DIHYDRATE 2 PUFF: 160; 4.5 AEROSOL RESPIRATORY (INHALATION) at 06:24

## 2021-04-06 RX ADMIN — BUDESONIDE AND FORMOTEROL FUMARATE DIHYDRATE 2 PUFF: 160; 4.5 AEROSOL RESPIRATORY (INHALATION) at 19:29

## 2021-04-06 RX ADMIN — FUROSEMIDE 40 MG: 10 INJECTION, SOLUTION INTRAMUSCULAR; INTRAVENOUS at 18:13

## 2021-04-06 RX ADMIN — FUROSEMIDE 40 MG: 10 INJECTION, SOLUTION INTRAMUSCULAR; INTRAVENOUS at 11:47

## 2021-04-06 RX ADMIN — IPRATROPIUM BROMIDE AND ALBUTEROL SULFATE 3 ML: .5; 2.5 SOLUTION RESPIRATORY (INHALATION) at 19:29

## 2021-04-06 RX ADMIN — PRAVASTATIN SODIUM 40 MG: 20 TABLET ORAL at 21:19

## 2021-04-06 RX ADMIN — CARVEDILOL 12.5 MG: 12.5 TABLET, FILM COATED ORAL at 18:13

## 2021-04-06 RX ADMIN — IPRATROPIUM BROMIDE AND ALBUTEROL SULFATE 3 ML: .5; 2.5 SOLUTION RESPIRATORY (INHALATION) at 14:35

## 2021-04-06 ASSESSMENT — ENCOUNTER SYMPTOMS
SHORTNESS OF BREATH: 1
DOUBLE VISION: 0
FALLS: 0
FEVER: 0
HEADACHES: 0
PALPITATIONS: 0
CHILLS: 0
DIARRHEA: 0
PND: 0
MYALGIAS: 0
LOSS OF CONSCIOUSNESS: 0
DIZZINESS: 0
VOMITING: 0
ABDOMINAL PAIN: 0
NAUSEA: 0
DEPRESSION: 0
BACK PAIN: 0
ORTHOPNEA: 1
BLURRED VISION: 0

## 2021-04-06 ASSESSMENT — PAIN DESCRIPTION - PAIN TYPE: TYPE: ACUTE PAIN

## 2021-04-06 ASSESSMENT — LIFESTYLE VARIABLES: PACK_YEARS: 70+

## 2021-04-06 NOTE — CARE PLAN
Problem: Communication  Goal: The ability to communicate needs accurately and effectively will improve  Outcome: PROGRESSING AS EXPECTED   Patient educated on medications, procedures and use of call light. Patient will continue to verbalize and improve on education and communication in the plan of care.    Problem: Knowledge Deficit  Goal: Knowledge of disease process/condition, treatment plan, diagnostic tests, and medications will improve  Outcome: PROGRESSING AS EXPECTED   Pt and family updated on POC, including medications, treatment and discharge planning. Questions answered as needed, pt and family verbalized understanding. Encouraged to voice further questions/concerns.

## 2021-04-06 NOTE — PROGRESS NOTES
Received bedside report and assumed care of patient. Patient is A&Ox4 and awake in bed. Patient showing no signs of distress, no complaints of pain, and is on 4L via nasal cannula. Tele monitor in place. Call light within reach, bed in low position, 2 side rails up, and belongings are within reach.  Patient was updated on Plan of Care, no questions or concerns. Pt educated on use of call light for assistance. Will continue to monitor.

## 2021-04-06 NOTE — PROGRESS NOTES
Report received from [facility] shift RN, assumed patient care. Patient is A&O x 4, on 4 L NC O2. Tele box on and in place. Patient denies any pain at this time. Patient updated on plan of care and verbalizes no questions. Patient has call light within reach, fall precautions in place. Patient educated to call for assistance as needed. Will continue to monitor.

## 2021-04-06 NOTE — ASSESSMENT & PLAN NOTE
Rate currently controlled  Continue home coreg    HASBLED: 2  [id number]: 2    Therefore patient would benefit from continued anticoagulation if can tolerate.

## 2021-04-06 NOTE — PROGRESS NOTES
2 RN Skin Check     2 RN skin check complete with Hernando JOHN.   Devices in place: telemetry monitoring, PIV, nasal cannula  Skin assessed under devices: yes.  Confirmed pressure ulcers found on: n/a.  New potential pressure ulcers noted on n/a. Wound consult placed No.  The following interventions in place Pillows and Barrier cream.        Skin is CDI, bilateral lower extremity have + 3 pitting/weeping  Edema, skin tear present on left shin.   Bilat ears red and blanching.

## 2021-04-06 NOTE — HEART FAILURE PROGRAM
Patient of Dr. Meyers at pulmonary clinic. He apparently has a PmHx of COPD, HFrEF, PHTN, AF, and YOSVANY.    Dr. Meyers found him to have SOB, 3+ pitting edema, cough, orthopnea, and PND. His EF is apparently 35% this is per MD notes as prior echo shows no results and current echo is not final resulted.     Looks like patient resides in [place].    Heart failure with reduced ejection fraction (HFrEF) is noted to be an acute diagnosis on this admission. Nursing: please complete the HF Discharge Checklist (pink sheet in hard chart) and have it cosigned by your charge RN before patient leaves the hospital.    Providers: below are all Guideline Directed Medical Therapy (GDMT) indicated for HFrEF. If any can not be prescribed by discharge, please note the clinical reason for each in your discharge summary.    QUICK SUMMARY OF HFrEF MEASURES    -- Evidence Based Beta Blocker (bisoprolol, carvedilol, or toprol xl), for EF of 40% or less  -- ELANA - I, for EF of 40% or less  -- Aldosterone antagonist, for EF of 35% or less  -- Anticoagulation for atrial arrhythmia, if applicable  -- Glycemic control for DM + HF, if diabetic  -- Lipid lowering medication for DM + HF, if diabetic  -- Hydralazine dinitrate, if pt self identifies as   -- Pneumococcal vaccine if not previously received  -- Influenza vaccine if not previously received for the current flu season  -- Smoking cessation counseling documented if applicable  -- Device screening if applicable    Daily Nurse: please begin to fill out the HF checklist (pink sheet in hard chart) and use it to guide your daily care.    Discharge Nurse: please ensure completeness of the HF checklist (pink sheet in hard chart) and have it co-signed by the charge RN before the patient leaves the hospital.    Thank you, Shawna, Cardio RN Navigator [id number]      DETAILED EXPLANATION OF HF MEASURES:  1. Documentation of LV systolic function (echo or cath) PTA, during this  hospitalization, or plan to assess post discharge or reason for not assessing documented  2. Documentation of fluid intake and urine output every nursing shift  3. 2 hour post diuretic assessment documented 2 hours after diuretic given  4. HF Patient Education using the Living Well With Heart Failure Booklet and Symptom Tracker documented every nursing shift  5. Nutrition consult for diet education  6. Daily weights (one weight documented every 24 hours) on a standing scale unless standing is contraindicated in which case bed scale can be used - have patient write weight on symptom tracker  7. For LVEF less than or equal to 40%, ACE-I, ARNI or ARB prescribed at discharge   8. For LVEF less than or equal to 40%, an Evidence Based Beta Blocker (bisoprolol, carvedilol, toprol xl) must be prescribed at discharge  9. For LVEF less than or equal to 35% aldosterone blockade prescribed at discharge  10. The combination of hydralazine and isosorbide dinitrate is recommended to reduce morbidity and mortality for patients self-described  Americans with NYHA class III-IV HFrEF (EF 40% or less), receiving optimal therapy with ACE inhibitors and beta blockers, unless contraindicated (Class I, FRANCESCO: A).  11. If a HF patient is diabetic or is newly diagnosed with DM: prescribed diabetes treatment at discharge in the form of glycemic control (diet or anti-hyperglycemic medication) or f/u appointment for diabetes management scheduled at discharge.  12. If a HF patient has diabetes: prescribed lipid lowering medication at discharge  13. Documented smoking cessation advice or counseling  14. If a HF patient has a-fib: anticoagulation is prescribed upon discharge or contraindication is documented  15. Screening for and administering immunizations as long as no contraindications: Pneumonia (regardless of age) and Influenza  16. Written discharge instructions include:  ? Daily weights  ? Record weight on tracker  ? Bring tracker to  appointments  ? Call MD for weight gain of 3lb /day or 5lb/week  ? HF medication teaching  ? Low sodium diet  ? Follow up appointment within seven calendar days of d/c must include: date, time and location  ? Activity  ? Worsening symptoms    What if any of the above HF measures are contraindicated?  ? Request that the discharging provider document the medication/intervention and the contraindication specifically in a progress note  ? For example: “no CHF meds due to hypotension” is not enough. It needs to say: “No ACE-I, ARNI, ARB due to hypotension”; “No Beta Blockade due to bradycardia”…

## 2021-04-06 NOTE — PROGRESS NOTES
[facility] Medicine Daily Progress Note    Date of Service  4/6/2021    Chief Complaint  76 y.o. male admitted 4/5/2021 with shortness of breath, hemoptysis.     Interval Problem Update  Patient was seen and examined at bedside.  No acute events overnight. Patient is resting comfortably in bed and in no acute distress.     Currently on baseline oxygen: 4L   Monitor response to diuresis  Cardiology consulted  Await 2D echo    Consultants/Specialty  Cardiology  Pulmonology    Code Status  Full Code    Disposition  Pending PT/OT eval when POC developed    Review of Systems  Review of Systems   Constitutional: Negative for chills and fever.   HENT: Negative for congestion and hearing loss.    Eyes: Negative for blurred vision and double vision.   Respiratory: Positive for shortness of breath.    Cardiovascular: Positive for leg swelling. Negative for chest pain and palpitations.   Gastrointestinal: Negative for diarrhea, nausea and vomiting.   Genitourinary: Negative for dysuria and frequency.   Musculoskeletal: Negative for back pain and myalgias.   Skin: Negative for rash.   Neurological: Negative for dizziness and headaches.   Psychiatric/Behavioral: Negative for depression.        Physical Exam  Temp:  [36 °C (96.8 °F)-36.5 °C (97.7 °F)] 36.2 °C (97.2 °F)  Pulse:  [66-98] 75  Resp:  [16-18] 18  BP: (102-119)/(59-75) 102/75  SpO2:  [93 %-98 %] 95 %    Physical Exam  Constitutional:       General: He is not in acute distress.     Appearance: He is ill-appearing. He is not toxic-appearing.   HENT:      Head: Atraumatic.      Right Ear: External ear normal.      Left Ear: External ear normal.      Nose: Nose normal. No congestion.      Mouth/Throat:      Mouth: Mucous membranes are moist.      Pharynx: No oropharyngeal exudate.   Eyes:      Pupils: Pupils are equal, round, and reactive to light.   Cardiovascular:      Rate and Rhythm: Normal rate.      Heart sounds: No murmur.   Pulmonary:      Effort: No respiratory  distress.      Breath sounds: No wheezing.      Comments: Faint crackles in left lower lung base  Abdominal:      Palpations: Abdomen is soft.      Tenderness: There is no abdominal tenderness. There is no guarding or rebound.   Musculoskeletal:         General: No deformity.      Right lower leg: Edema present.      Left lower leg: Edema present.   Skin:     Coloration: Skin is not jaundiced.      Findings: No bruising.      Comments: Melanocytic nevi measuring 0.3mm located on right shoulder/neck.   Chronic venous insufficiency in lower extremities bilaterally.    Neurological:      General: No focal deficit present.      Mental Status: He is alert and oriented to person, place, and time. Mental status is at baseline.      Cranial Nerves: No cranial nerve deficit.   Psychiatric:         Mood and Affect: Mood normal.         Behavior: Behavior normal.         Thought Content: Thought content normal.         Judgment: Judgment normal.         Fluids    Intake/Output Summary (Last 24 hours) at 4/6/2021 1243  Last data filed at 4/6/2021 1216  Gross per 24 hour   Intake 520 ml   Output 1475 ml   Net -955 ml       Laboratory  Recent Labs     04/05/21  1232 04/06/21  0238   WBC 7.3 7.4   RBC 4.15* 4.10*   HEMOGLOBIN 12.9* 12.6*   HEMATOCRIT 41.9* 41.5*   .0* 101.2*   MCH 31.1 30.7   MCHC 30.8* 30.4*   RDW 58.0* 58.1*   PLATELETCT 115* 120*   MPV 11.4 11.6     Recent Labs     04/05/21  1232 04/06/21  0238   SODIUM 139 136   POTASSIUM 4.6 4.1   CHLORIDE 98 97   CO2 32 35*   GLUCOSE 136* 122*   BUN 36* 40*   CREATININE 1.15 1.14   CALCIUM 9.4 9.3     Recent Labs     04/05/21  1232   INR 1.59*               Imaging  US-EXTREMITY VENOUS LOWER BILAT   Final Result      EC-ECHOCARDIOGRAM COMPLETE W/O CONT         CT-CTA CHEST PULMONARY ARTERY W/ RECONS   Final Result      1.  No CT evidence of pulmonary embolism.      2.  Cardiomegaly including right-sided chamber enlargement and venous distention.      3.  Scarring is  again noted in the right lung base. Right-sided pleural calcification again noted.            DX-CHEST-LIMITED (1 VIEW)   Final Result      Cardiomegaly and findings of CHF with interstitial edema and small right pleural effusion.           Assessment/Plan  * Acute on chronic HFrEF (heart failure with reduced ejection fraction) ([facility])- (present on admission)  Assessment & Plan  Patient presenting with dyspnea on exertion, orthopnea and substernal chest pain  Fluid overload on physical exam  Chest x-ray showing interstitial edema as well as small right pleural effusion  BNP in the 1600s  2D echo pending  BID Lasix - monitor lytes  Cardiology following, patient recommendations    NSTEMI (non-ST elevated myocardial infarction) ([facility])- (present on admission)  Assessment & Plan  Reporting a 1 month history of episodic oppressive substernal chest pain that worsens with exertion and improves with rest, also dyspnea  EKG with right bundle branch block, unable to determine ischemia secondary to artifact  Troponin peak 117  Likely secondary to demand in the setting of CHF, ACS not ruled out  Continue home beta-blocker therapy (coreg) and high-dose statin  Cardiology consulted, appreciate recommendations    Hemoptysis- (present on admission)  Assessment & Plan  Correlated anticoagulation use  Recurrent hemoptysis with resumption of xarelto  CTA negative for PE  Hold anticoagulation for now      Pulmonary hypertension (HCC)- (present on admission)  Assessment & Plan  Last echocardiogram on 3/2021 with RVSP 55  Secondary to cor pulmonale from chronic COPD/smoking  Patient still smoking  Fluid restriction  I/O  Daily weights  Diuresis  2D echo pending  Pulmonology follow-up, appreciate recommendations    Atrial fibrillation (HCC)  Assessment & Plan  Rate currently controlled  Continue home coreg  Continue to hold xarelto in setting of recurrent hemoptysis      History of COPD- (present on admission)  Assessment & Plan  Oxygen  requirements back to baseline 4L  RT/O2  Continue home inhalers       VTE prophylaxis: SCD

## 2021-04-06 NOTE — THERAPY
Missed Therapy Evaluation     Patient Name: Derek Delgado  Age:  76 y.o., Sex:  male  Medical Record #: [id number]  Today's Date: 4/6/2021    Discussed missed therapy with RN    PT order received. Pt was admitted for dyspnea on exertion and substernal chest pain on exertion. Pt is currently pending cardiology consult. PT eval will be held until POC is developed    Lelia Lauren PT ,DPT

## 2021-04-06 NOTE — CONSULTS
Pulmonary Consultation       Patient ID:   Name:             Derek Delgado   YOB: 1945  Age:                 76 y.o.  male   MRN:               [id number]                                                  Reason of Consult:  Hemoptysis ,     Requesting physician:  Dr. Landeros     History of Present Illness:  76-year-old male with a previous history of COPD, YOSVANY on CPAP/4L nocturnal O2, A. fib on Xarelto, HFrEF 35% (last echo 3/21), left atrial enlargement, pulmonary hypertension with RVSP 55 mmHg, current tobacco use who was referred from pulmonary medicine clinic to the ER for acute CHF exacerbation.   Patient was seen at the pulmonary medicine clinic for hemoptysis ongoing for 1-1/2 weeks, intermittent with Xarelto and subsided when he stopped taking it.  Also reports of bilateral lower extremity swelling for around 1 week.  Of note he tells me that he was admitted and [place] for decompensated heart failure 2 years ago,was diuresed and on discharge his weight was 265 pounds.  Previously would have bilateral leg swelling which would subside on its own.   In the clinic patient was found to be hypoxic and requiring 8 L.  Currently after receiving diuresis saturating 92% on 3 L of supplemental oxygen.   Admits to smoking a pack per day, over 30 years of smoking history.  Occasional alcohol use.    ROS:  Complete ROS was done and was negative except what mentioned in HPI     Past Medical History:  Past Medical History:   Diagnosis Date   • Chest tightness    • CHF (congestive heart failure) (HCC)    • COPD (chronic obstructive pulmonary disease) (HCC)    • Cough    • Difficulty breathing    • Hyperlipidemia    • Palpitations    • Shortness of breath    • Sputum production    • Swelling of lower extremity    • Wheezing        Past surgical history:  Past Surgical History:   Procedure Laterality Date   • ROTATOR CUFF REPAIR Left        Family History:  Family History    Problem Relation Age of Onset   • Stroke Mother    • Glasses Mother    • Cancer Father    • Glasses Father        Hospital Medications:    Current Facility-Administered Medications:   •  ipratropium-albuterol (DUONEB) nebulizer solution, 3 mL, Nebulization, 4X/DAY (RT), Michael Ayon M.D., 3 mL at 04/06/21 1145  •  furosemide (LASIX) injection 40 mg, 40 mg, Intravenous, BID DIURETIC, Omkar Rivera D.O., 40 mg at 04/06/21 1147  •  senna-docusate (PERICOLACE or SENOKOT S) 8.6-50 MG per tablet 2 tablet, 2 tablet, Oral, BID, 2 tablet at 04/05/21 1718 **AND** polyethylene glycol/lytes (MIRALAX) PACKET 1 Packet, 1 Packet, Oral, QDAY PRN **AND** magnesium hydroxide (MILK OF MAGNESIA) suspension 30 mL, 30 mL, Oral, QDAY PRN **AND** bisacodyl (DULCOLAX) suppository 10 mg, 10 mg, Rectal, QDAY PRN, Zen Camacho M.D.  •  acetaminophen (Tylenol) tablet 650 mg, 650 mg, Oral, Q6HRS PRN, Zen Camacho M.D.  •  labetalol (NORMODYNE/TRANDATE) injection 10 mg, 10 mg, Intravenous, Q4HRS PRN, Zen Camacho M.D.  •  ondansetron (ZOFRAN) syringe/vial injection 4 mg, 4 mg, Intravenous, Q4HRS PRN, Zen Camacho M.D.  •  ondansetron (ZOFRAN ODT) dispertab 4 mg, 4 mg, Oral, Q4HRS PRN, Zen Camacho M.D.  •  guaiFENesin dextromethorphan (ROBITUSSIN DM) 100-10 MG/5ML syrup 10 mL, 10 mL, Oral, Q6HRS PRN, Zen Camacho M.D.  •  carvedilol (COREG) tablet 12.5 mg, 12.5 mg, Oral, BID WITH MEALS, Zen D Landeros Janice, M.D., 12.5 mg at 04/06/21 0806  •  [Held by provider] losartan (COZAAR) tablet 25 mg, 25 mg, Oral, DAILY, Zen Camacho M.D.  •  pravastatin (PRAVACHOL) tablet 40 mg, 40 mg, Oral, Nightly, Zen Camacho M.D., 40 mg at 04/05/21 5497  •  [Held by provider] rivaroxaban (XARELTO) tablet 15 mg, 15 mg, Oral, PM MEAL, Zen Camacho M.D.  •  [Held by provider] spironolactone (ALDACTONE) tablet 12.5 mg, 12.5 mg, Oral, DAILY,  "Zen Camacho M.D.  •  melatonin tablet 10 mg, 10 mg, Oral, QHS PRN, Juan Blum M.D., 10 mg at 04/05/21 6386  •  budesonide-formoterol (SYMBICORT) 160-4.5 MCG/ACT inhaler 2 Puff, 2 Puff, Inhalation, BID (RT), Zen Camacho M.D., 2 Puff at 04/06/21 0624    Current Outpatient Medications:  Medications Prior to Admission   Medication Sig Dispense Refill Last Dose   • carvedilol (COREG) 12.5 MG Tab Take 12.5 mg by mouth 2 times a day, with meals.   4/4/2021 at 0600   • furosemide (LASIX) 40 MG Tab Take 40 mg by mouth 2 times a day.   4/4/2021 at 1200   • ipratropium-albuterol (DUONEB) 0.5-2.5 (3) MG/3ML nebulizer solution 3 mL by Nebulization route 4 times a day.   4/4/2021 at 2000   • losartan (COZAAR) 25 MG Tab Take 25 mg by mouth every day.   4/4/2021 at 0600   • pravastatin (PRAVACHOL) 40 MG tablet Take 40 mg by mouth every evening.   4/4/2021 at 1800   • spironolactone (ALDACTONE) 25 MG Tab Take 15 mg by mouth every day. Half a pill   4/4/2021 at 0600   • rivaroxaban (XARELTO) 20 MG Tab tablet Take 15 mg by mouth with dinner.   4/4/2021 at 1200   • fluticasone-salmeterol (ADVAIR) 500-50 MCG/DOSE AEROSOL POWDER, BREATH ACTIVATED Inhale 1 Puff by mouth every 12 hours.   4/3/2021 at 0600       Medication Allergy:  No Known Allergies          Objective:   Vitals/ General Appearance:   Weight/BMI: Body mass index is 37.7 kg/m².  /75   Pulse 75   Temp 36.2 °C (97.2 °F) (Temporal)   Resp 18   Ht 1.905 m (6' 3\")   Wt (!) 137 kg (301 lb 9.4 oz)   SpO2 95%   Vitals:    04/06/21 0716 04/06/21 0805 04/06/21 1141 04/06/21 1145   BP: 119/67  102/75    Pulse: 87 98 68 75   Resp: 18  18 18   Temp: 36.4 °C (97.5 °F)  36.2 °C (97.2 °F)    TempSrc: Temporal  Temporal    SpO2: 93%  95% 95%   Weight:       Height:         Oxygen Therapy:  Pulse Oximetry: 95 %, O2 (LPM): 4, O2 Delivery Device: Silicone Nasal Cannula    Constitutional: Elderly male, sitting in the recliner in bed, in no acute " distress  HENMT:  Normocephalic, Atraumatic, Oropharynx moist mucous membranes,   Eyes:  EOMI, Conjunctiva normal, No discharge.  Neck:  Normal range of motion, No cervical tenderness,  + JVD.  Cardiovascular:  Normal heart rate, Normal rhythm, No murmurs, No rubs, No gallops.   Extremitites with 3+ pitting edema, edema extending up to upper thighs   Lungs:  Crackles bilateral lower lung fields, no wheezing.   Abdomen: Bowel sounds normal, Soft, No tenderness, No guarding, No rebound, No masses, No hepatosplenomegaly.  Skin: Warm, Dry, No erythema, No rash, no induration.  Neurologic: Alert & oriented x 3, No focal deficits noted, cranial nerves II through X are grossly intact.  Psychiatric: Affect normal, Judgment normal, Mood normal.    Labs:  Recent Labs     04/05/21  1232 04/06/21  0238   WBC 7.3 7.4   RBC 4.15* 4.10*   HEMOGLOBIN 12.9* 12.6*   HEMATOCRIT 41.9* 41.5*   .0* 101.2*   MCH 31.1 30.7   MCHC 30.8* 30.4*   RDW 58.0* 58.1*   PLATELETCT 115* 120*   MPV 11.4 11.6     Recent Labs     04/05/21  1232   INR 1.59*             Recent Labs     04/05/21  1232 04/06/21  0238   SODIUM 139 136   POTASSIUM 4.6 4.1   CHLORIDE 98 97   CO2 32 35*   GLUCOSE 136* 122*   BUN 36* 40*     Recent Labs     04/05/21  1232 04/06/21  0238   SODIUM 139 136   POTASSIUM 4.6 4.1   CHLORIDE 98 97   CO2 32 35*   BUN 36* 40*   CREATININE 1.15 1.14   MAGNESIUM 2.2 2.3   PHOSPHORUS 3.7 4.3   CALCIUM 9.4 9.3     No results found for this or any previous visit.      Imaging:   US-EXTREMITY VENOUS LOWER BILAT   Final Result      EC-ECHOCARDIOGRAM COMPLETE W/O CONT         CT-CTA CHEST PULMONARY ARTERY W/ RECONS   Final Result      1.  No CT evidence of pulmonary embolism.      2.  Cardiomegaly including right-sided chamber enlargement and venous distention.      3.  Scarring is again noted in the right lung base. Right-sided pleural calcification again noted.            DX-CHEST-LIMITED (1 VIEW)   Final Result      Cardiomegaly and  findings of CHF with interstitial edema and small right pleural effusion.              Assessment and Plan:    1.Acute decompensated biventricular heart failure, EF 35-40 % 03/2021  2.Hemoptysis  3.COPD  4.Tobacco use    -Acute on chronic hypoxic respiratory failure in the setting of decompensated heart failure.  Feeling better today after diuresis.  Recommend aggressive diuresis.  -Hemoptysis likely secondary to above.  Will defer bronchoscopy for now and monitor monitor for improvement once patient close to his dry weight.   -PFTs for severity of COPD  -Tobacco cessation counseling provided.

## 2021-04-06 NOTE — RESPIRATORY CARE
"COPD EDUCATION by COPD CLINICAL EDUCATOR  4/6/2021  at  3:10 PM by Mariela Oquendo, RRT     Patient interviewed by COPD education team.  Patient unable to participate in full program.  Short intervention has been conducted.  A comprehensive packet including information about COPD, treatments, and smoking cessation given.   COPD Assessment  COPD Clinical Specialists ONLY  COPD Education Initiated: Yes--Short Intervention  Pt lives in [place], [place] and declined full program   He has YOSVANY on CPAP/ O2 @ 4lpm at night.  O2 thru out day   He uses a nebulizer 4 xday with Duoneb Reviewed cleaning of equipment Provided Spacer with instruction   Smoking Cessation Total of 6 minutes spent educating cessation strategies  Physician Name: Pt to schedule Dr Keane  Pulmonologist Name: encouraged discussion with PCP for PFT /Sleep testing  Is this a COPD exacerbation patient?: No  $ Demo/Eval of SVN's, MDI's and Aerosols: Yes     MY COPD ACTION PLAN     It is recommended that patients and physicians /healthcare providers complete this action plan together. This plan should be discussed at each physician visit and updated as needed.    The green, yellow and red zones show groups of symptoms of COPD. This list of symptoms is not comprehensive, and you may experience other symptoms. In the \"Actions\" column, your healthcare provider has recommended actions for you to take based on your symptoms.    Patient Name: Derek Delgado   YOB: 1945   Last Updated on:     Green Zone:  I am doing well today Actions   •  Usual activitiy and exercise level •  Take daily medications   •  Usual amounts of cough and phlegm/mucus •  Use oxygen as prescribed   •  Sleep well at night •  Continue regular exercise/diet plan   •  Appetite is good •  At all times avoid cigarette smoke, inhaled irritants     Daily Medications (these medications are taken every day):   Fluticosone/Salmeterol (Advair)  Albuterol/Ipratropium (Combivent, Duoneb) " "1 Puff  3mL via nebulizer Twice daily  Four Times daily     Additional Information:    Rinse Your Mouth After Using Advair Medication      Yellow Zone:  I am having a bad day or a COPD flare Actions   •  More breathless than usual •  Continue daily medications   •  I have less energy for my daily activities •  Use quick relief inhaler as ordered   •  Increased or thicker phlegm/mucus •  Use oxygen as prescribed   •  Using quick relief inhaler/nebulizer more often •  Get plenty of rest   •  Swelling of ankles more than usual •  Use pursed lip breathing   •  More coughing than usual •  At all times avoid cigarette smoke, inhaled irritants   •  I feel like I have a \"chest cold\"   •  Poor sleep and my symptoms woke me up   •  My appetite is not good   •  My medicine is not helping    •  Call provider immediately if symptoms don’t improve     Continue daily medications, add rescue medications:   Albuterol/Ipratropium (Combivent, Duoneb)  Albuterol 3mL via nebulizer  2 Puffs Every 4 hours PRN  Every 4 hours PRN       Medications to be used during a flare up, (as Discussed with Provider):              Red Zone:  I need urgent medical care Actions   •  Severe shortness of breath even at rest •  Call 911 or seek medical care immediately   •  Not able to do any activity because of breathing    •  Fever or shaking chills    •  Feeling confused or very drowsy     •  Chest pains    •  Coughing up blood              "

## 2021-04-06 NOTE — CARE PLAN
Problem: Safety  Goal: Will remain free from injury  Outcome: PROGRESSING AS EXPECTED  Goal: Will remain free from falls  Outcome: PROGRESSING AS EXPECTED   Fall precautions in place. Call bell within reach. Educated patient to call for assistance.     Problem: Respiratory:  Goal: Respiratory status will improve  Outcome: PROGRESSING SLOWER THAN EXPECTED   Patient on 4L via nasal cannula. Without oxygen, he's pxygen saturation drops to 73%.

## 2021-04-07 PROBLEM — Z72.0 TOBACCO ABUSE: Status: ACTIVE | Noted: 2021-04-07

## 2021-04-07 LAB
ALBUMIN SERPL BCP-MCNC: 3.6 G/DL (ref 3.2–4.9)
BASOPHILS # BLD AUTO: 0.6 % (ref 0–1.8)
BASOPHILS # BLD: 0.05 K/UL (ref 0–0.12)
BUN SERPL-MCNC: 38 MG/DL (ref 8–22)
CALCIUM SERPL-MCNC: 9.2 MG/DL (ref 8.5–10.5)
CHLORIDE SERPL-SCNC: 98 MMOL/L (ref 96–112)
CO2 SERPL-SCNC: 35 MMOL/L (ref 20–33)
CREAT SERPL-MCNC: 1.09 MG/DL (ref 0.5–1.4)
EOSINOPHIL # BLD AUTO: 0.16 K/UL (ref 0–0.51)
EOSINOPHIL NFR BLD: 2.1 % (ref 0–6.9)
ERYTHROCYTE [DISTWIDTH] IN BLOOD BY AUTOMATED COUNT: 57.6 FL (ref 35.9–50)
GLUCOSE SERPL-MCNC: 96 MG/DL (ref 65–99)
HCT VFR BLD AUTO: 39.9 % (ref 42–52)
HGB BLD-MCNC: 12.4 G/DL (ref 14–18)
IMM GRANULOCYTES # BLD AUTO: 0.02 K/UL (ref 0–0.11)
IMM GRANULOCYTES NFR BLD AUTO: 0.3 % (ref 0–0.9)
LYMPHOCYTES # BLD AUTO: 1.42 K/UL (ref 1–4.8)
LYMPHOCYTES NFR BLD: 18.3 % (ref 22–41)
MAGNESIUM SERPL-MCNC: 2.2 MG/DL (ref 1.5–2.5)
MCH RBC QN AUTO: 31.4 PG (ref 27–33)
MCHC RBC AUTO-ENTMCNC: 31.1 G/DL (ref 33.7–35.3)
MCV RBC AUTO: 101 FL (ref 81.4–97.8)
MONOCYTES # BLD AUTO: 0.83 K/UL (ref 0–0.85)
MONOCYTES NFR BLD AUTO: 10.7 % (ref 0–13.4)
NEUTROPHILS # BLD AUTO: 5.27 K/UL (ref 1.82–7.42)
NEUTROPHILS NFR BLD: 68 % (ref 44–72)
NRBC # BLD AUTO: 0 K/UL
NRBC BLD-RTO: 0 /100 WBC
PHOSPHATE SERPL-MCNC: 4 MG/DL (ref 2.5–4.5)
PLATELET # BLD AUTO: 116 K/UL (ref 164–446)
PMV BLD AUTO: 11.3 FL (ref 9–12.9)
POTASSIUM SERPL-SCNC: 4.4 MMOL/L (ref 3.6–5.5)
RBC # BLD AUTO: 3.95 M/UL (ref 4.7–6.1)
SODIUM SERPL-SCNC: 137 MMOL/L (ref 135–145)
TROPONIN T SERPL-MCNC: 109 NG/L (ref 6–19)
TROPONIN T SERPL-MCNC: 111 NG/L (ref 6–19)
TROPONIN T SERPL-MCNC: 112 NG/L (ref 6–19)
WBC # BLD AUTO: 7.8 K/UL (ref 4.8–10.8)

## 2021-04-07 PROCEDURE — 700101 HCHG RX REV CODE 250: Performed by: HOSPITALIST

## 2021-04-07 PROCEDURE — 94640 AIRWAY INHALATION TREATMENT: CPT

## 2021-04-07 PROCEDURE — A9270 NON-COVERED ITEM OR SERVICE: HCPCS | Performed by: STUDENT IN AN ORGANIZED HEALTH CARE EDUCATION/TRAINING PROGRAM

## 2021-04-07 PROCEDURE — 94760 N-INVAS EAR/PLS OXIMETRY 1: CPT

## 2021-04-07 PROCEDURE — 94660 CPAP INITIATION&MGMT: CPT

## 2021-04-07 PROCEDURE — 83735 ASSAY OF MAGNESIUM: CPT

## 2021-04-07 PROCEDURE — 80069 RENAL FUNCTION PANEL: CPT

## 2021-04-07 PROCEDURE — 97162 PT EVAL MOD COMPLEX 30 MIN: CPT

## 2021-04-07 PROCEDURE — 84484 ASSAY OF TROPONIN QUANT: CPT | Mod: 91

## 2021-04-07 PROCEDURE — 700102 HCHG RX REV CODE 250 W/ 637 OVERRIDE(OP): Performed by: STUDENT IN AN ORGANIZED HEALTH CARE EDUCATION/TRAINING PROGRAM

## 2021-04-07 PROCEDURE — 85025 COMPLETE CBC W/AUTO DIFF WBC: CPT

## 2021-04-07 PROCEDURE — 99232 SBSQ HOSP IP/OBS MODERATE 35: CPT | Mod: 25 | Performed by: INTERNAL MEDICINE

## 2021-04-07 PROCEDURE — 99406 BEHAV CHNG SMOKING 3-10 MIN: CPT | Performed by: INTERNAL MEDICINE

## 2021-04-07 PROCEDURE — 770020 HCHG ROOM/CARE - TELE (206)

## 2021-04-07 RX ADMIN — IPRATROPIUM BROMIDE AND ALBUTEROL SULFATE 3 ML: .5; 2.5 SOLUTION RESPIRATORY (INHALATION) at 11:25

## 2021-04-07 RX ADMIN — BUDESONIDE AND FORMOTEROL FUMARATE DIHYDRATE 2 PUFF: 160; 4.5 AEROSOL RESPIRATORY (INHALATION) at 07:46

## 2021-04-07 RX ADMIN — IPRATROPIUM BROMIDE AND ALBUTEROL SULFATE 3 ML: .5; 2.5 SOLUTION RESPIRATORY (INHALATION) at 20:04

## 2021-04-07 RX ADMIN — DOCUSATE SODIUM 50 MG AND SENNOSIDES 8.6 MG 2 TABLET: 8.6; 5 TABLET, FILM COATED ORAL at 17:45

## 2021-04-07 RX ADMIN — BUDESONIDE AND FORMOTEROL FUMARATE DIHYDRATE 2 PUFF: 160; 4.5 AEROSOL RESPIRATORY (INHALATION) at 20:04

## 2021-04-07 RX ADMIN — PRAVASTATIN SODIUM 40 MG: 20 TABLET ORAL at 20:53

## 2021-04-07 RX ADMIN — LOSARTAN POTASSIUM 25 MG: 25 TABLET, FILM COATED ORAL at 08:47

## 2021-04-07 RX ADMIN — CARVEDILOL 12.5 MG: 12.5 TABLET, FILM COATED ORAL at 08:48

## 2021-04-07 RX ADMIN — Medication 10 MG: at 20:56

## 2021-04-07 RX ADMIN — RIVAROXABAN 15 MG: 15 TABLET, FILM COATED ORAL at 17:45

## 2021-04-07 RX ADMIN — IPRATROPIUM BROMIDE AND ALBUTEROL SULFATE 3 ML: .5; 2.5 SOLUTION RESPIRATORY (INHALATION) at 07:46

## 2021-04-07 ASSESSMENT — COGNITIVE AND FUNCTIONAL STATUS - GENERAL
MOBILITY SCORE: 20
MOVING TO AND FROM BED TO CHAIR: A LITTLE
TURNING FROM BACK TO SIDE WHILE IN FLAT BAD: A LITTLE
CLIMB 3 TO 5 STEPS WITH RAILING: A LITTLE
SUGGESTED CMS G CODE MODIFIER MOBILITY: CJ
MOVING FROM LYING ON BACK TO SITTING ON SIDE OF FLAT BED: A LITTLE

## 2021-04-07 ASSESSMENT — GAIT ASSESSMENTS
DISTANCE (FEET): 50
GAIT LEVEL OF ASSIST: SUPERVISED
DEVIATION: INCREASED BASE OF SUPPORT

## 2021-04-07 ASSESSMENT — PAIN DESCRIPTION - PAIN TYPE
TYPE: ACUTE PAIN

## 2021-04-07 ASSESSMENT — ENCOUNTER SYMPTOMS
MYALGIAS: 0
DOUBLE VISION: 0
BACK PAIN: 0
SORE THROAT: 0
SHORTNESS OF BREATH: 1
DIARRHEA: 0
DIZZINESS: 0
NAUSEA: 0
PALPITATIONS: 0
FLANK PAIN: 0
DEPRESSION: 0
BLURRED VISION: 0
HEADACHES: 0
VOMITING: 0
FEVER: 0
CHILLS: 0

## 2021-04-07 ASSESSMENT — FIBROSIS 4 INDEX: FIB4 SCORE: 3.93

## 2021-04-07 NOTE — PROGRESS NOTES
[facility] Medicine Daily Progress Note    Date of Service  4/7/2021    Chief Complaint  76 y.o. male admitted 4/5/2021 with shortness of breath, hemoptysis.     Interval Problem Update  Patient was seen and examined at bedside.  No acute events overnight. Patient is resting comfortably in bed and in no acute distress. No further episodes of hemoptysis.      Currently on baseline oxygen: 3L   Responding to diuresis: net negative 2.4L  Cardiology and pulmonology consults noted  2D echo: EF 50%, moderately dilated RV, PASP 65    Consultants/Specialty  Cardiology  Pulmonology    Code Status  Full Code    Disposition  Home with  when medically stable    Review of Systems  Review of Systems   Constitutional: Negative for chills, fever and malaise/fatigue.   HENT: Negative for congestion and sore throat.    Eyes: Negative for blurred vision and double vision.   Respiratory: Positive for shortness of breath.    Cardiovascular: Positive for leg swelling. Negative for chest pain and palpitations.   Gastrointestinal: Negative for diarrhea, nausea and vomiting.   Genitourinary: Negative for dysuria, flank pain and frequency.   Musculoskeletal: Negative for back pain and myalgias.   Skin: Negative for rash.   Neurological: Negative for dizziness and headaches.   Psychiatric/Behavioral: Negative for depression.        Physical Exam  Temp:  [36.3 °C (97.3 °F)-36.6 °C (97.8 °F)] 36.3 °C (97.4 °F)  Pulse:  [65-99] 78  Resp:  [14-18] 18  BP: (105-128)/(55-73) 116/69  SpO2:  [90 %-100 %] 96 %    Physical Exam  Constitutional:       General: He is not in acute distress.     Appearance: He is ill-appearing. He is not toxic-appearing.   HENT:      Head: Atraumatic.      Right Ear: External ear normal.      Left Ear: External ear normal.      Nose: Nose normal. No congestion.      Mouth/Throat:      Mouth: Mucous membranes are moist.      Pharynx: No oropharyngeal exudate.   Eyes:      Pupils: Pupils are equal, round, and reactive to  light.   Cardiovascular:      Rate and Rhythm: Normal rate.      Heart sounds: No murmur.   Pulmonary:      Effort: No respiratory distress.      Breath sounds: No wheezing.      Comments: Faint crackles in left lower lung base  Abdominal:      Palpations: Abdomen is soft.      Tenderness: There is no abdominal tenderness. There is no guarding or rebound.   Musculoskeletal:         General: No deformity.      Right lower leg: Edema present.      Left lower leg: Edema present.   Skin:     Coloration: Skin is not jaundiced.      Findings: No bruising.      Comments: Melanocytic nevi measuring 0.3mm located on right shoulder/neck.   Chronic venous insufficiency in lower extremities bilaterally.    Neurological:      General: No focal deficit present.      Mental Status: He is alert and oriented to person, place, and time. Mental status is at baseline.      Cranial Nerves: No cranial nerve deficit.   Psychiatric:         Mood and Affect: Mood normal.         Behavior: Behavior normal.         Thought Content: Thought content normal.         Judgment: Judgment normal.       Patient was seen and examined at bedside. No changes in physical exam from prior evaluation.    Fluids    Intake/Output Summary (Last 24 hours) at 4/7/2021 1443  Last data filed at 4/7/2021 0337  Gross per 24 hour   Intake --   Output 1450 ml   Net -1450 ml       Laboratory  Recent Labs     04/05/21  1232 04/06/21  0238 04/07/21  0207   WBC 7.3 7.4 7.8   RBC 4.15* 4.10* 3.95*   HEMOGLOBIN 12.9* 12.6* 12.4*   HEMATOCRIT 41.9* 41.5* 39.9*   .0* 101.2* 101.0*   MCH 31.1 30.7 31.4   MCHC 30.8* 30.4* 31.1*   RDW 58.0* 58.1* 57.6*   PLATELETCT 115* 120* 116*   MPV 11.4 11.6 11.3     Recent Labs     04/05/21  1232 04/06/21  0238 04/07/21  0207   SODIUM 139 136 137   POTASSIUM 4.6 4.1 4.4   CHLORIDE 98 97 98   CO2 32 35* 35*   GLUCOSE 136* 122* 96   BUN 36* 40* 38*   CREATININE 1.15 1.14 1.09   CALCIUM 9.4 9.3 9.2     Recent Labs     04/05/21  1232    INR 1.59*               Imaging  US-EXTREMITY VENOUS LOWER BILAT   Final Result      EC-ECHOCARDIOGRAM COMPLETE W/O CONT   Final Result      CT-CTA CHEST PULMONARY ARTERY W/ RECONS   Final Result      1.  No CT evidence of pulmonary embolism.      2.  Cardiomegaly including right-sided chamber enlargement and venous distention.      3.  Scarring is again noted in the right lung base. Right-sided pleural calcification again noted.            DX-CHEST-LIMITED (1 VIEW)   Final Result      Cardiomegaly and findings of CHF with interstitial edema and small right pleural effusion.           Assessment/Plan  * Acute on chronic HFrEF (heart failure with reduced ejection fraction) ([facility])- (present on admission)  Assessment & Plan  Patient presenting with dyspnea on exertion, orthopnea and substernal chest pain  Fluid overload on physical exam  Chest x-ray showing interstitial edema as well as small right pleural effusion  BNP in the 1600s  2D echo: EF 50%, moderately dilated RV, PASP 65  BID Lasix - monitor lytes  Net negative 2.4L    NSTEMI (non-ST elevated myocardial infarction) ([facility])- (present on admission)  Assessment & Plan  Likely secondary to demand in the setting of his known atrial fibrillation and pulmonary hypertension.  No chest pain  Troponin peak 117  Cardiology consuluted      Hemoptysis- (present on admission)  Assessment & Plan  Correlated anticoagulation use  Recurrent hemoptysis with resumption of xarelto  CTA negative for PE  Will resume xarelto with close monitoring    HASBLED: 2  [id number]: 2        Pulmonary hypertension (HCC)- (present on admission)  Assessment & Plan  Last echocardiogram on 3/2021 with RVSP 55  Secondary to cor pulmonale from chronic COPD/smoking  Patient still smoking  2D echo: EF 50%, moderately dilated RV, PASP 65  Pulmonology recommendation noted    Tobacco abuse  Assessment & Plan  Tobacco cessation counseling and education provided for 6 minutes. Nicotine replacement options  provided including patch, and further medical treatments including Wellbutrin and Chantix. As well as over the counter options of lozenges and gum.      Atrial fibrillation (HCC)  Assessment & Plan  Rate currently controlled  Continue home coreg    HASBLED: 2  [id number]: 2    Therefore patient would benefit from continued anticoagulation if can tolerate.       History of COPD- (present on admission)  Assessment & Plan  Oxygen requirements back to baseline 4L  RT/O2  Continue home inhalers       VTE prophylaxis: SCD

## 2021-04-07 NOTE — CONSULTS
Cardiology Initial Consultation    Date of Service  4/6/2021    Referring Physician  Omkar Rivera D.O.    Reason for Consultation  Elevated troponin    History of Presenting Illness  Derek Delgado is a 76 y.o. male with known history of cardiomyopathy who presented 4/5/2021 with hemoptysis.  Patient was seen by his pulmonologist when he reported recent episodes of hemoptysis and was fluid overloaded triggering this admission.  He has gained 20 pounds in the past 6 months.  He reports having orthopnea associated with some mild chest discomfort while lying flat which improves with sitting up or turning to his side.  He also reports having dyspnea on exertion.  No lower extremity edema.    He has not been seen by a cardiologist for a few years.  In the past he feels that he underwent an ischemic work-up which was low risk.  He would like to defer testing as much as possible due to financial reasons.    Currently feels better.  Denies any symptoms while at rest.  Reports being compliant with his dietary modification prior to presentation.    Review of Systems  Review of Systems   Constitutional: Negative for malaise/fatigue.   Respiratory: Positive for shortness of breath.    Cardiovascular: Positive for orthopnea. Negative for chest pain, palpitations, leg swelling and PND.   Gastrointestinal: Negative for abdominal pain.   Musculoskeletal: Negative for falls.   Neurological: Negative for dizziness and loss of consciousness.   Psychiatric/Behavioral: Negative for depression.   All other systems reviewed and are negative.      Past Medical History   has a past medical history of Chest tightness, CHF (congestive heart failure) (HCC), COPD (chronic obstructive pulmonary disease) (HCC), Cough, Difficulty breathing, Hyperlipidemia, Palpitations, Shortness of breath, Sputum production, Swelling of lower extremity, and Wheezing. He also has no past medical history of Chest pain, Fainting, or Painful breathing.    Surgical  History   has a past surgical history that includes rotator cuff repair (Left).    Family History  family history includes Cancer in his father; Glasses in his father and mother; Stroke in his mother.    Social History   reports that he has been smoking cigarettes. He started smoking about 42 years ago. He has a 40.00 pack-year smoking history. He has never used smokeless tobacco. He reports current alcohol use.    Home Medications   Prior to Admission Medications   Prescriptions Last Dose Informant Patient Reported? Taking?   carvedilol (COREG) 12.5 MG Tab 4/4/2021 at 0600  Yes No   Sig: Take 12.5 mg by mouth 2 times a day, with meals.   fluticasone-salmeterol (ADVAIR) 500-50 MCG/DOSE AEROSOL POWDER, BREATH ACTIVATED 4/3/2021 at 0600  Yes No   Sig: Inhale 1 Puff by mouth every 12 hours.   furosemide (LASIX) 40 MG Tab 4/4/2021 at 1200  Yes No   Sig: Take 40 mg by mouth 2 times a day.   ipratropium-albuterol (DUONEB) 0.5-2.5 (3) MG/3ML nebulizer solution 4/4/2021 at 2000  Yes No   Sig: 3 mL by Nebulization route 4 times a day.   losartan (COZAAR) 25 MG Tab 4/4/2021 at 0600  Yes No   Sig: Take 25 mg by mouth every day.   pravastatin (PRAVACHOL) 40 MG tablet 4/4/2021 at 1800  Yes No   Sig: Take 40 mg by mouth every evening.   rivaroxaban (XARELTO) 20 MG Tab tablet 4/4/2021 at 1200  Yes No   Sig: Take 15 mg by mouth with dinner.   spironolactone (ALDACTONE) 25 MG Tab 4/4/2021 at 0600  Yes No   Sig: Take 15 mg by mouth every day. Half a pill      Facility-Administered Medications: None       Inpatient Medications  • ipratropium-albuterol  3 mL 4X/DAY (RT)   • furosemide  40 mg BID DIURETIC   • senna-docusate  2 tablet BID    And   • polyethylene glycol/lytes  1 Packet QDAY PRN    And   • magnesium hydroxide  30 mL QDAY PRN    And   • bisacodyl  10 mg QDAY PRN   • acetaminophen  650 mg Q6HRS PRN   • labetalol  10 mg Q4HRS PRN   • ondansetron  4 mg Q4HRS PRN   • ondansetron  4 mg Q4HRS PRN   • guaiFENesin dextromethorphan   10 mL Q6HRS PRN   • carvedilol  12.5 mg BID WITH MEALS   • [Held by provider] losartan  25 mg DAILY   • pravastatin  40 mg Nightly   • [Held by provider] rivaroxaban  15 mg PM MEAL   • [Held by provider] spironolactone  12.5 mg DAILY   • melatonin  10 mg QHS PRN   • budesonide-formoterol  2 Puff BID (RT)       Allergies  No Known Allergies    Vital signs in last 24 hours  Temp:  [36 °C (96.8 °F)-36.5 °C (97.7 °F)] 36.5 °C (97.7 °F)  Pulse:  [65-98] 86  Resp:  [16-18] 18  BP: (102-123)/(59-75) 107/67  SpO2:  [93 %-98 %] 95 %    Physical Exam  Physical Exam   Constitutional: He is oriented to person, place, and time and well-developed, well-nourished, and in no distress. No distress.   HENT:   Head: Normocephalic and atraumatic.   Eyes: Conjunctivae are normal. No scleral icterus.   Neck: No JVD present.   Cardiovascular: Normal rate and intact distal pulses. An irregular rhythm present. Exam reveals no gallop and no friction rub.   No murmur heard.  Pulmonary/Chest: Effort normal and breath sounds normal. No respiratory distress. He has no wheezes. He has no rales. He exhibits no tenderness.   Abdominal: Soft. Bowel sounds are normal. He exhibits no distension. There is no abdominal tenderness.   Musculoskeletal:         General: No edema. Normal range of motion.      Cervical back: Normal range of motion and neck supple.   Neurological: He is alert and oriented to person, place, and time.   Skin: Skin is warm and dry. No rash noted. He is not diaphoretic.   Psychiatric: Mood, memory, affect and judgment normal.   Nursing note and vitals reviewed.      Lab Review  Recent Labs     04/05/21  1232 04/06/21  0238   WBC 7.3 7.4   RBC 4.15* 4.10*   HEMOGLOBIN 12.9* 12.6*   HEMATOCRIT 41.9* 41.5*   PLATELETCT 115* 120*   .0* 101.2*   MPV 11.4 11.6     Recent Labs     04/05/21  1232 04/06/21  0238   SODIUM 139 136   POTASSIUM 4.6 4.1   CHLORIDE 98 97   CO2 32 35*   GLUCOSE 136* 122*   BUN 36* 40*   CREATININE 1.15  1.14      Lab Results   Component Value Date/Time    TROPONINT 114 (H) 04/06/2021 01:37 PM       Lab Results   Component Value Date/Time    ASTSGOT 31 04/06/2021 02:38 AM    ALTSGPT 26 04/06/2021 02:38 AM     No results found for: CHOLSTRLTOT, LDL, HDL, TRIGLYCERIDE      Labs reviewed as noted above.  Normal renal function.    Cardiac Imaging and Procedures Review  EKG performed yesterday was personally reviewed and per my interpretation shows atrial fibrillation, occasional PVCs.    Echocardiogram performed today was personally reviewed and per my interpretation shows preserved LV systolic function.  RV is severely dilated.  RVSP in the 60s.    Assessment/Plan    Elevated troponin:  History of cardiomyopathy:  Pulmonary hypertension:  Atrial fibrillation:  Hemoptysis:    Patient's elevated troponin is likely secondary to demand in the setting of his known atrial fibrillation and pulmonary hypertension.  Patient should be considered for repeat coronary evaluation however at this time with his hemoptysis this is not possible.  Also patient requests that any testing be deferred to as an outpatient if possible.  Since he is currently asymptomatic, would recommend following up as an outpatient for ischemic work-up.    For his atrial fibrillation, continue carvedilol at current dose.  He is rate controlled.  Holding off on Xarelto for now because of his hemoptysis.  Would consider resuming this when possible.  Will defer this to primary team.    He has pulmonary hypertension based on echo criteria.  Likely secondary to his underlying COPD.  Continue Lasix per primary team.  Recommend outpatient follow-up.      Cardiology will sign off.  Thank you for allowing me to participate in the care of this patient. Please do not hesitate to contact me with any questions.    Abi Izquierdo MD, [facility]  Cardiologist  [facility]

## 2021-04-07 NOTE — DISCHARGE PLANNING
Received Choice form at 1515  Agency/Facility Name: [facility]  Referral sent per Choice form @ [phone]
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
## 2021-04-07 NOTE — THERAPY
Physical Therapy   Initial Evaluation     Patient Name: Derek Delgado  Age:  76 y.o., Sex:  male  Medical Record #: [id number]  Today's Date: 4/7/2021     Precautions: Fall Risk    Assessment  Patient is 76 y.o. male admitted for SOB, pitting edema, and hemoptysis. PmHx of COPD, HFrEF 35%, PHTN, AF, and YOSVANY. Pt lives with spouse and dtr who both work during the day. Pt reported not being very active and primarily stays at home. Pt currently ambulating short household distances with no AD and SPV. Pt is likely close to his functional baseline but is limited by activity tolerance and balance. Pt desaturated to 83% on 3L while mobilizing but recovered back to 90% with seated rest. PT will cont while in acute care setting to address deficits.       Plan    Recommend Physical Therapy 3 times per week until therapy goals are met for the following treatments:  Gait Training, Neuro Re-Education / Balance, Self Care/Home Evaluation, Therapeutic Activities and Therapeutic Exercises    DC Equipment Recommendations: None  Discharge Recommendations: Recommend home health for continued physical therapy services(if HHPT not possible, home with assist from family)          04/07/21 0849   Vitals   O2 (LPM) 3   O2 Delivery Device Silicone Nasal Cannula   Prior Living Situation   Prior Services None   Housing / Facility 1 Story House   Steps Into Home 1   Steps In Home 0   Equipment Owned Single Point Cane   Lives with - Patient's Self Care Capacity Adult Children;Spouse   Comments pt lives with his wife and adult daughter   Prior Level of Functional Mobility   Bed Mobility Independent   Transfer Status Independent   Ambulation Independent   Distance Ambulation (Feet)   (household)   Assistive Devices Used None   Comments independent prior with mobility   History of Falls   History of Falls Yes   Cognition    Level of Consciousness Alert   Comments cooperative   Sensation Lower Body   Lower Extremity Sensation   X   Comments diminished  LE strength   Gait Analysis   Gait Level Of Assist Supervised   Assistive Device None   Distance (Feet) 50   # of Times Distance was Traveled 1   Deviation Increased Base Of Support   # of Stairs Climbed 0   Weight Bearing Status no restrictions   Comments no LOB, occaisionally reaching for wall, would benefit from SPC   Bed Mobility    Supine to Sit   (NT EOB)   Sit to Supine   (NT EOB)   Scooting Supervised   Functional Mobility   Sit to Stand Supervised   Mobility in room with no AD   Edema / Skin Assessment   Edema / Skin  X   Comments B LE edema   Short Term Goals    Short Term Goal # 1 pt will be able to ambulate 150ft with no AD and SPV in 6tx in order to return to baseline   Short Term Goal # 2 pt will be able to transfer supine<>sitting with SPV in 6tx in order to return to baseline   Anticipated Discharge Equipment and Recommendations   DC Equipment Recommendations None   Discharge Recommendations Recommend home health for continued physical therapy services  (if HHPT not possible, home with assist from family)

## 2021-04-07 NOTE — PROGRESS NOTES
Assumed care of pt. Bedside report received from day shift RNShanda. Pt aaox4, pt on 4L NC and no signs of distress noted at this time. Tele box is on and rhythm verified. POC discussed with pt and pt verbalized no questions at this time. Bed low and locked. Call light and personal belongings within reach. All needs met at this time. Will continue POC

## 2021-04-07 NOTE — CARE PLAN
Problem: Fluid Volume:  Goal: Will maintain balanced intake and output  Outcome: PROGRESSING AS EXPECTED  Intervention: Monitor, educate, and encourage compliance with therapeutic intake of liquids  Note: Patient understands the need for current fluid restriction     Problem: Respiratory:  Goal: Respiratory status will improve  Outcome: PROGRESSING AS EXPECTED  Intervention: Administer and titrate oxygen therapy  Flowsheets (Taken 4/7/2021 0749 by Pebbles Espino, RRT)  O2 (LPM): 4  Note: Patient on oxygen 4 L as well as having continuous pulse oximetry

## 2021-04-07 NOTE — PROGRESS NOTES
Assumed care of PT A&O 4. Pt resting in bed with mild signs of labored breathing. On 4 L NC. Tele monitor in place, cardiac rhythm being monitored. Call light within reach, bed in lowest position, upper bed rails up. Pt was updated on plan of care for the day . Will continue to monitor.

## 2021-04-07 NOTE — FACE TO FACE
Face to Face Supporting Documentation - Home Health    The encounter with this patient was in whole or in part the primary reason for home health admission.    Date of encounter:   Patient:                    MRN:                       YOB: 2020  Derek Delgado  [id number]  1945     Home health to see patient for:  Skilled Nursing care for assessment, interventions & education, Physical Therapy evaluation and treatment and Occupational therapy evaluation and treatment    Skilled need for:  Exacerbation of Chronic Disease State Acute on chronic systolic heart failure    Skilled nursing interventions to include:  Comment:      Homebound status evidenced by:  Needs the assistance of another person in order to leave the home. Leaving home requires a considerable and taxing effort. There is a normal inability to leave the home.    Community Physician to provide follow up care: Jerardo Mckenna M.D.     Optional Interventions? No      I certify the face to face encounter for this home health care referral meets the CMS requirements and the encounter/clinical assessment with the patient was, in whole, or in part, for the medical condition(s) listed above, which is the primary reason for home health care. Based on my clinical findings: the service(s) are medically necessary, support the need for home health care, and the homebound criteria are met.  I certify that this patient has had a face to face encounter by myself.  Omkar Rivera D.O. - NPI: [id number]

## 2021-04-07 NOTE — ASSESSMENT & PLAN NOTE
Tobacco cessation counseling and education provided for 6 minutes. Nicotine replacement options provided including patch, and further medical treatments including Wellbutrin and Chantix. As well as over the counter options of lozenges and gum.

## 2021-04-07 NOTE — CARE PLAN
Problem: Communication  Goal: The ability to communicate needs accurately and effectively will improve  Outcome: PROGRESSING AS EXPECTED  Patient educated to utilize call light. Patient oriented to hospital room. Patient encouraged to ask questions about plan of care. Patient effectively uses call light and is involved in POC. Pt verbalizes understanding.      Problem: Safety  Goal: Will remain free from injury  Outcome: PROGRESSING AS EXPECTED  Goal: Will remain free from falls  Outcome: PROGRESSING AS EXPECTED  Pt remains free from falls at this time. Safety precautions in place. Pt educated on calling for assistance when needed.

## 2021-04-07 NOTE — DISCHARGE PLANNING
Met with patient to get choice for [facility]. Patient chose:     [facility]      Faxed to DANIEL Farmer.

## 2021-04-08 VITALS
HEIGHT: 75 IN | DIASTOLIC BLOOD PRESSURE: 61 MMHG | OXYGEN SATURATION: 88 % | RESPIRATION RATE: 20 BRPM | SYSTOLIC BLOOD PRESSURE: 127 MMHG | HEART RATE: 88 BPM | TEMPERATURE: 97.8 F | BODY MASS INDEX: 36.35 KG/M2 | WEIGHT: 292.33 LBS

## 2021-04-08 LAB
ALBUMIN SERPL BCP-MCNC: 3.4 G/DL (ref 3.2–4.9)
BASOPHILS # BLD AUTO: 0.6 % (ref 0–1.8)
BASOPHILS # BLD: 0.04 K/UL (ref 0–0.12)
BUN SERPL-MCNC: 34 MG/DL (ref 8–22)
CALCIUM SERPL-MCNC: 9 MG/DL (ref 8.5–10.5)
CHLORIDE SERPL-SCNC: 99 MMOL/L (ref 96–112)
CO2 SERPL-SCNC: 34 MMOL/L (ref 20–33)
CREAT SERPL-MCNC: 0.88 MG/DL (ref 0.5–1.4)
EOSINOPHIL # BLD AUTO: 0.14 K/UL (ref 0–0.51)
EOSINOPHIL NFR BLD: 1.9 % (ref 0–6.9)
ERYTHROCYTE [DISTWIDTH] IN BLOOD BY AUTOMATED COUNT: 57.5 FL (ref 35.9–50)
GLUCOSE SERPL-MCNC: 97 MG/DL (ref 65–99)
HCT VFR BLD AUTO: 38.8 % (ref 42–52)
HGB BLD-MCNC: 12 G/DL (ref 14–18)
IMM GRANULOCYTES # BLD AUTO: 0.03 K/UL (ref 0–0.11)
IMM GRANULOCYTES NFR BLD AUTO: 0.4 % (ref 0–0.9)
LYMPHOCYTES # BLD AUTO: 1.26 K/UL (ref 1–4.8)
LYMPHOCYTES NFR BLD: 17.5 % (ref 22–41)
MAGNESIUM SERPL-MCNC: 2.2 MG/DL (ref 1.5–2.5)
MCH RBC QN AUTO: 31.7 PG (ref 27–33)
MCHC RBC AUTO-ENTMCNC: 30.9 G/DL (ref 33.7–35.3)
MCV RBC AUTO: 102.4 FL (ref 81.4–97.8)
MONOCYTES # BLD AUTO: 0.89 K/UL (ref 0–0.85)
MONOCYTES NFR BLD AUTO: 12.4 % (ref 0–13.4)
NEUTROPHILS # BLD AUTO: 4.82 K/UL (ref 1.82–7.42)
NEUTROPHILS NFR BLD: 67.2 % (ref 44–72)
NRBC # BLD AUTO: 0 K/UL
NRBC BLD-RTO: 0 /100 WBC
PHOSPHATE SERPL-MCNC: 3 MG/DL (ref 2.5–4.5)
PLATELET # BLD AUTO: 111 K/UL (ref 164–446)
PMV BLD AUTO: 11.5 FL (ref 9–12.9)
POTASSIUM SERPL-SCNC: 4.1 MMOL/L (ref 3.6–5.5)
RBC # BLD AUTO: 3.79 M/UL (ref 4.7–6.1)
SODIUM SERPL-SCNC: 137 MMOL/L (ref 135–145)
TROPONIN T SERPL-MCNC: 113 NG/L (ref 6–19)
WBC # BLD AUTO: 7.2 K/UL (ref 4.8–10.8)

## 2021-04-08 PROCEDURE — 700102 HCHG RX REV CODE 250 W/ 637 OVERRIDE(OP): Performed by: STUDENT IN AN ORGANIZED HEALTH CARE EDUCATION/TRAINING PROGRAM

## 2021-04-08 PROCEDURE — 94640 AIRWAY INHALATION TREATMENT: CPT

## 2021-04-08 PROCEDURE — 94660 CPAP INITIATION&MGMT: CPT

## 2021-04-08 PROCEDURE — A9270 NON-COVERED ITEM OR SERVICE: HCPCS | Performed by: STUDENT IN AN ORGANIZED HEALTH CARE EDUCATION/TRAINING PROGRAM

## 2021-04-08 PROCEDURE — 99239 HOSP IP/OBS DSCHRG MGMT >30: CPT | Performed by: INTERNAL MEDICINE

## 2021-04-08 PROCEDURE — 700101 HCHG RX REV CODE 250: Performed by: HOSPITALIST

## 2021-04-08 PROCEDURE — 80069 RENAL FUNCTION PANEL: CPT

## 2021-04-08 PROCEDURE — 83735 ASSAY OF MAGNESIUM: CPT

## 2021-04-08 PROCEDURE — 85025 COMPLETE CBC W/AUTO DIFF WBC: CPT

## 2021-04-08 RX ORDER — GUAIFENESIN/DEXTROMETHORPHAN 100-10MG/5
10 SYRUP ORAL EVERY 6 HOURS PRN
Qty: 840 ML | Refills: 0 | Status: SHIPPED | OUTPATIENT
Start: 2021-04-08

## 2021-04-08 RX ADMIN — BUDESONIDE AND FORMOTEROL FUMARATE DIHYDRATE 2 PUFF: 160; 4.5 AEROSOL RESPIRATORY (INHALATION) at 06:41

## 2021-04-08 RX ADMIN — IPRATROPIUM BROMIDE AND ALBUTEROL SULFATE 3 ML: .5; 2.5 SOLUTION RESPIRATORY (INHALATION) at 06:41

## 2021-04-08 RX ADMIN — CARVEDILOL 12.5 MG: 12.5 TABLET, FILM COATED ORAL at 07:39

## 2021-04-08 ASSESSMENT — PAIN DESCRIPTION - PAIN TYPE
TYPE: ACUTE PAIN
TYPE: ACUTE PAIN

## 2021-04-08 ASSESSMENT — FIBROSIS 4 INDEX: FIB4 SCORE: 4.11

## 2021-04-08 NOTE — DISCHARGE INSTRUCTIONS
Discharge Instructions    Discharged to home by car with relative. Discharged via wheelchair, hospital escort: Yes.  Special equipment needed: Oxygen    Be sure to schedule a follow-up appointment with your primary care doctor or any specialists as instructed.     Discharge Plan:   Diet Plan: Discussed  Activity Level: Discussed  Confirmed Follow up Appointment: Appointment Scheduled  Confirmed Symptoms Management: Discussed  Medication Reconciliation Updated: Yes    I understand that a diet low in cholesterol, fat, and sodium is recommended for good health. Unless I have been given specific instructions below for another diet, I accept this instruction as my diet prescription.   Other diet: heart healthy    Special Instructions:   HF Patient Discharge Instructions  · Monitor your weight daily, and maintain a weight chart, to track your weight changes.   · Activity as tolerated, unless your Doctor has ordered otherwise.  · Follow a low fat, low cholesterol, low salt diet unless instructed otherwise by your Doctor. Read the labels on the back of food products and track your intake of fat, cholesterol and salt.   · Fluid Restriction Yes. If a Fluid Restriction has been ordered by your Doctor, measure fluids with a measuring cup to ensure that you are not exceeding the restriction.   · No smoking.  · Oxygen Yes. If your Doctor has ordered that you wear Oxygen at home, it is important to wear it as ordered.  · Did you receive an explanation from staff on the importance of taking each of your medications and why it is necessary to keep taking them unless your doctor says to stop? Yes  · Were all of your questions answered about how to manage your heart failure and what to do if you have increased signs and symptoms after you go home? Yes  · Do you feel like your heart failure care team involved you in the care treatment plan and allowed you to make decisions regarding your care while in the hospital and addressed any  discharge needs you might have? Yes    See the educational handout provided at discharge for more information on monitoring your daily weight, activity and diet. This also explains more about Heart Failure, symptoms of a flare-up and some of the tests that you have undergone.     Warning Signs of a Flare-Up include:  · Swelling in the ankles or lower legs.  · Shortness of breath, while at rest, or while doing normal activities.   · Shortness of breath at night when in bed, or coughing in bed.   · Requiring more pillows to sleep at night, or needing to sit up at night to sleep.  · Feeling weak, dizzy or fatigued.     When to call your Doctor:  · Call [facility] seven days a week from 8:00 a.m. to 8:00 p.m. for medical questions [phone].  · Call your Primary Care Physician or Cardiologist if:   1. You experience any pain radiating to your jaw or neck.  2. You have any difficulty breathing.  3. You experience weight gain of 3 lbs in a day or 5 lbs in a week.   4. You feel any palpitations or irregular heartbeats.  5. You become dizzy or lose consciousness.   If you have had an angiogram or had a pacemaker or AICD placed, and experience:  1. Bleeding, drainage or swelling at the surgical / puncture site.  2. Fever greater than 100.0 F  3. Shock from internal defibrillator.  4. Cool and / or numb extremities.   and Chronic Obstructive Pulmonary Disease (COPD) is a long-term, progressive lung disease that makes it harder to breathe. It includes chronic bronchitis, emphysema, and refractory (non-reversible) asthma. With COPD, the airways in your lungs become inflamed and thicken, and the tissue where oxygen is exchanged is destroyed. The flow of air in and out of your lungs decreases. When that happens, less oxygen gets into your body tissues, and it becomes harder to get rid of the waste gas carbon dioxide. As the disease gets worse, shortness of breath makes it harder to remain active. There is no cure  for COPD, but it is preventable and treatable.    COPD Patient Discharge Instructions    • Diet  o Follow a low fat, low cholesterol, low salt diet unless instructed otherwise by your Doctor. Read the labels on the back of food products and track your intake of fat, cholesterol and salt.  • No smoking  o Discontinuing smoking will have the biggest impact on preventing progression of disease.  o To participate in [employer]’s Quit Tobacco Program, call 759-6444 or visit [employer]/QuitTobacco  • Oxygen  o If your doctor has order that you wear oxygen at home, it is important to wear it as ordered.  o Do not smoke, vape, or use e-cigarettes with oxygen on.  o Store in an appropriate location: upright in its quezada or laid flat down, away from open flames and stoves.   o Do not use oil-based creams and moisturizers (ie: petroleum products, oil-based lip moisturizers) or aerosol sprays (ie: hair sprays or deodorants) when using your oxygen equipment.  o Be careful with tubing placement to prevent yourself and others from tripping.  • Medications  o Refer to your personalized Action Plan to manage your symptoms.  • Warning signs of an exacerbation  o Breathing fast and shallow, worsening shortness of breath (like you just finished exercising)  o Chest tightness  o Increases in sputum production  o Changes in sputum color  o Lower oxygen levels than baseline  • When to call your doctor  o If the warning signs of an exacerbation do not improve  o Refer to your personalized Action Plan   • Pulmonary Rehab  o Your doctor has ordered you a referral to Pulmonary Rehab. Call [phone] to schedule an appointment  • Attend your follow-up appointment with your PCP and/or Pulmonologist  • Remote Monitoring: At the direction of the remote monitoring on-call provider, you may increase your oxygen by 2 liters above your baseline.     See the educational handout provided by your COPD Navigator for more information. This also explains  more about COPD, symptoms of an exacerbation, and some of the tests that you have undergone.    · Is patient discharged on Warfarin / Coumadin?   No     Depression / Suicide Risk    As you are discharged from this [facility] facility, it is important to learn how to keep safe from harming yourself.    Recognize the warning signs:  · Abrupt changes in personality, positive or negative- including increase in energy   · Giving away possessions  · Change in eating patterns- significant weight changes-  positive or negative  · Change in sleeping patterns- unable to sleep or sleeping all the time   · Unwillingness or inability to communicate  · Depression  · Unusual sadness, discouragement and loneliness  · Talk of wanting to die  · Neglect of personal appearance   · Rebelliousness- reckless behavior  · Withdrawal from people/activities they love  · Confusion- inability to concentrate     If you or a loved one observes any of these behaviors or has concerns about self-harm, here's what you can do:  · Talk about it- your feelings and reasons for harming yourself  · Remove any means that you might use to hurt yourself (examples: pills, rope, extension cords, firearm)  · Get professional help from the community (Mental Health, Substance Abuse, psychological counseling)  · Do not be alone:Call your Safe Contact- someone whom you trust who will be there for you.  · Call your local CRISIS HOTLINE 585-3876 or 918-396-0790  · Call your local Children's Mobile Crisis Response Team [place] (330) 867-3069 or www.[employer]  · Call the toll free National Suicide Prevention Hotlines   · National Suicide Prevention Lifeline 352-312-PTLO (9558)  · National Hope Line Network 800-SUICIDE (714-7135)

## 2021-04-08 NOTE — DISCHARGE PLANNING
Agency/Facility Name: [facility]  Outcome: Patient accepted, Need DC Summary and patient to set up appointment with PCP

## 2021-04-08 NOTE — DISCHARGE SUMMARY
Discharge Summary    CHIEF COMPLAINT ON ADMISSION  No chief complaint on file.      Reason for Admission  CHF exacerbation     Admission Date  4/5/2021    CODE STATUS  Full Code    HPI & HOSPITAL COURSE  76 y.o. male with a past medical history of COPD on 4 L nasal cannula baseline, YOSVANY on CPAP, atrial fibrillation on Xarelto, and CHF with reduced ejection fraction. Presented to his outpatient pulmonology clinic on 4/4/2021 with a 3-week history of hemoptysis along with dyspnea on exertion and substernal chest pain on exertion. Admitted for acute on chronic HFrEF with BNP in the 1600's, CXR demonstrating interstitial edema as well as small right pleural effusion. Cardiology was consulted. 2D echo: EF 50%, moderately dilated RV, PASP 65. Patient was diuresed and responded well, net negative 3.1L at discharge. Hb remained stable, no episodes of hemoptysis while admitted and xarelto was resumed on 04/07 and monitored for additional 24 hours without any episodes of hemoptysis. Pulmonology was consulted and recommend possible bronch in future if hemoptysis resumes. Patient was resumed on Xarelto for Afib, HASBLED 2, [id number] 2. May also consider stopping xarelto if continues to have hemoptysis. Patient was determined satisfactory for discharge on baseline O2 with appropriate follow up and establish care with home health.     Therefore, he is discharged in fair and stable condition to home with organized home healthcare and close outpatient follow-up.    The patient met 2-midnight criteria for an inpatient stay at the time of discharge.    Discharge Date  04/08/2021    FOLLOW UP ITEMS POST DISCHARGE  Please follow up with PCP in 3-5 days for post hospitalization follow up and medication reconciliation.       DISCHARGE DIAGNOSES  Principal Problem:    Acute on chronic HFrEF (heart failure with reduced ejection fraction) ([facility]) POA: Yes  Active Problems:    NSTEMI (non-ST elevated myocardial infarction) ([facility]) POA: Yes     Pulmonary hypertension (HCC) POA: Yes    Hemoptysis POA: Yes    Atrial fibrillation (HCC) POA: Unknown    Tobacco abuse POA: Unknown    History of COPD POA: Yes  Resolved Problems:    * No resolved hospital problems. *      FOLLOW UP  No future appointments.  Jerardo Mckenna M.D.  [address] [facility] [address]  [phone]    Go on 4/12/2021  Please arrive at 11:00 am for your appointment . Thank you       MEDICATIONS ON DISCHARGE     Medication List      START taking these medications      Instructions   guaiFENesin dextromethorphan 100-10 MG/5ML Syrp syrup  Commonly known as: ROBITUSSIN DM   Take 10 mL by mouth every 6 hours as needed for Cough.  Dose: 10 mL        CONTINUE taking these medications      Instructions   carvedilol 12.5 MG Tabs  Commonly known as: COREG   Take 12.5 mg by mouth 2 times a day, with meals.  Dose: 12.5 mg     fluticasone-salmeterol 500-50 MCG/DOSE Aepb  Commonly known as: ADVAIR   Inhale 1 Puff by mouth every 12 hours.  Dose: 1 Puff     furosemide 40 MG Tabs  Commonly known as: LASIX   Take 40 mg by mouth 2 times a day.  Dose: 40 mg     ipratropium-albuterol 0.5-2.5 (3) MG/3ML nebulizer solution  Commonly known as: DUONEB   3 mL by Nebulization route 4 times a day.  Dose: 3 mL     losartan 25 MG Tabs  Commonly known as: COZAAR   Take 25 mg by mouth every day.  Dose: 25 mg     pravastatin 40 MG tablet  Commonly known as: PRAVACHOL   Take 40 mg by mouth every evening.  Dose: 40 mg     spironolactone 25 MG Tabs  Commonly known as: ALDACTONE   Take 15 mg by mouth every day. Half a pill  Dose: 15 mg     Xarelto 20 MG Tabs tablet  Generic drug: rivaroxaban   Take 15 mg by mouth with dinner.  Dose: 15 mg            Allergies  No Known Allergies    DIET  Orders Placed This Encounter   Procedures   • Diet Order Diet: Cardiac; Fluid modifications: (optional): 1500 ml Fluid Restriction     Standing Status:   Standing     Number of Occurrences:   1     Order Specific Question:   Diet:      Answer:   Cardiac [6]     Order Specific Question:   Fluid modifications: (optional)     Answer:   1500 ml Fluid Restriction [9]       ACTIVITY  As tolerated.  Weight bearing as tolerated    CONSULTATIONS  Cardiology  Pulmonology    PROCEDURES  N/A    LABORATORY  Lab Results   Component Value Date    SODIUM 137 04/08/2021    POTASSIUM 4.1 04/08/2021    CHLORIDE 99 04/08/2021    CO2 34 (H) 04/08/2021    GLUCOSE 97 04/08/2021    BUN 34 (H) 04/08/2021    CREATININE 0.88 04/08/2021        Lab Results   Component Value Date    WBC 7.2 04/08/2021    HEMOGLOBIN 12.0 (L) 04/08/2021    HEMATOCRIT 38.8 (L) 04/08/2021    PLATELETCT 111 (L) 04/08/2021        Total time of the discharge process exceeds 41 minutes.

## 2021-04-08 NOTE — PROGRESS NOTES
Assumed care of pt. Bedside report received from day shift RNNhi. Pt aaox4, on 4L NC and no signs of distress noted at this time. Tele box is on and rhythm verifed. POC discussed with pt and pt verbalized no questions at this time. Bed low and locked. Call light and personal belongings within reach. All needs met at this time. Will continue POC

## 2021-04-08 NOTE — DISCHARGE SUMMARY
Faxed dc summary to [facility].Pt is accepted and will be out to see him tomorrow.     Pt has PCP appt Monday, 4/12 at 11 am.

## 2021-04-08 NOTE — CARE PLAN
Problem: Safety  Goal: Will remain free from injury  4/7/2021 2205 by Viola Chung R.N.  Outcome: PROGRESSING AS EXPECTED  Pt remains free from falls at this time. Safety precautions in place. Pt educated on calling for assistance when needed.  Pt voices his understanding to call for assistance.      Problem: Respiratory:  Goal: Respiratory status will improve  Outcome: PROGRESSING AS EXPECTED  .Patient respiratory status assessed. Patient educated on importance of coughing and deep breathing. Deep breaths are encouraged. Educated on how ambulation and movement can affect respiratory status. Patient indicates understanding. Pt is on 4L at this time, 4L is also his baseline.

## 2021-04-08 NOTE — DISCHARGE PLANNING
Faxed dc summary to [facility].Pt is accepted and will be out to see him tomorrow.      Pt has PCP appt Monday, 4/12 at 11 am.

## 2021-04-08 NOTE — PROGRESS NOTES
Bedside report received from night shift RN. Assumed patient care. No signs of distress at this time. Tele box on and rhythm verified. Safety precautions in place. Call light and personal belongings within reach. Educated patient to use call light if assistance is needed. Will continue to monitor.

## 2021-04-08 NOTE — CARE PLAN
Problem: Communication  Goal: The ability to communicate needs accurately and effectively will improve  Outcome: PROGRESSING AS EXPECTED     Problem: Safety  Goal: Will remain free from injury  Outcome: PROGRESSING AS EXPECTED  Goal: Will remain free from falls  Outcome: PROGRESSING AS EXPECTED     Problem: Discharge Barriers/Planning  Goal: Patient's continuum of care needs will be met  Outcome: PROGRESSING AS EXPECTED     Problem: Fluid Volume:  Goal: Will maintain balanced intake and output  Outcome: PROGRESSING AS EXPECTED

## 2021-04-23 ENCOUNTER — TELEPHONE (OUTPATIENT)
Dept: SLEEP MEDICINE | Facility: MEDICAL CENTER | Age: 76
End: 2021-04-23

## 2021-04-23 NOTE — TELEPHONE ENCOUNTER
04/19/2021: Called pt and lm to return my call re: 4 week follow up with Dr Meyers    04/20/2021: Called and spoke with pt. Pt said he would like to think about making a follow up appt. Told pt I will call him on Friday 04/23/21 to see if he would like to make an appt. Pt understood.      04/23/21: Called and spoke with pt.  Pt declined making an appt. Pt said he doesn't know right now.The [place] and [place] hard [place] them. Told pt to talk with his wife and call me back to let me know when they are able to come to [place]. Pt understood.

## 2023-03-21 NOTE — PROGRESS NOTES
03/28/23    Subjective    Chief Complaint:  Anemia and thrombocytopenia and abnormal proteins    HPI:  78 male with h/o AF, CHF and COPD referred for consultation by Dr. Jerardo Mckenna in [place] because of anemia, thrombocytopenia, elevated alkaline phosphatase. Lab accompanying the referral includes an H/H of 11.8/37.6 and platelet count of 116K. And a globulin of 4.4. There is an electrophoresis but difficult to interpret.     ROS:    Constitutional: No weight loss  Skin: No rash or jaundice  HENT: No change in eyesight or hearing  Cardiovascular:No chest pain or arrythmia  Respiratory:No cough or SOB  GI:No nausea, vomiting, diarrhea, constipation  :No dysuria or frequency  Musculoskeletal:No bone or joint pain  Neuro:No sx's of neuropathy  Psych: No complaints    PMH:      No Known Allergies    Past Medical History:   Diagnosis Date    Chest tightness     CHF (congestive heart failure) ([facility])     COPD (chronic obstructive pulmonary disease) ([facility])     Cough     Difficulty breathing     Hyperlipidemia     Palpitations     Shortness of breath     Sputum production     Swelling of lower extremity     Wheezing         Past Surgical History:   Procedure Laterality Date    ROTATOR CUFF REPAIR Left         Medications:    Current Outpatient Medications on File Prior to Visit   Medication Sig Dispense Refill    guaiFENesin dextromethorphan (ROBITUSSIN DM) 100-10 MG/5ML Syrup syrup Take 10 mL by mouth every 6 hours as needed for Cough. 840 mL 0    carvedilol (COREG) 12.5 MG Tab Take 12.5 mg by mouth 2 times a day, with meals.      furosemide (LASIX) 40 MG Tab Take 40 mg by mouth 2 times a day.      ipratropium-albuterol (DUONEB) 0.5-2.5 (3) MG/3ML nebulizer solution 3 mL by Nebulization route 4 times a day.      losartan (COZAAR) 25 MG Tab Take 25 mg by mouth every day.      pravastatin (PRAVACHOL) 40 MG tablet Take 40 mg by mouth every evening.      spironolactone (ALDACTONE) 25 MG Tab Take 15 mg by mouth every day.  Half a pill      rivaroxaban (XARELTO) 20 MG Tab tablet Take 15 mg by mouth with dinner.      fluticasone-salmeterol (ADVAIR) 500-50 MCG/DOSE AEROSOL POWDER, BREATH ACTIVATED Inhale 1 Puff by mouth every 12 hours.       No current facility-administered medications on file prior to visit.       Social History     Tobacco Use    Smoking status: Every Day     Packs/day: 1.00     Years: 40.00     Pack years: 40.00     Types: Cigarettes     Start date: 1979    Smokeless tobacco: Never   Substance Use Topics    Alcohol use: Yes        Family History   Problem Relation Age of Onset    Stroke Mother     Glasses Mother     Cancer Father     Glasses Father         Objective    Vitals:    There were no vitals taken for this visit.    Physical Exam:    Appears well-developed and well-nourished. No distress.    Head -  Normocephalic .   Eyes - Pupils are equal. Conjunctivae normal. No scleral icterus.   Ears - normal hearing  Mouth - Throat: Oropharynx is clear and moist. No oropharyngeal exudate  Neck - Neck supple. No thyromegaly  Cardiovascular - Normal rate, regular rhythm, normal heart sounds and intact distal pulses. No  gallop, murmur or rub  Pulmonary - Normal breath sounds.  No wheeze, rales or rhonchi  Breast - symmetrical. No mass on indentation.  Abdominal -Soft. No distension, tenderness, organomegaly or mass  Extremities-  No edema or tenderness.    Nodes - No submental, submandibular, preauricular, cervical, axillary or inguinal adenopathy.    Neurological -   Alert and oriented.  Skin - Skin is warm and dry. No rash noted. Not diaphoretic. No erythema. No pallor. No jaundice   Psychiatric -  Normal mood and affect.    Labs:      Assessment    Imp:    Visit Diagnosis:    1. Thrombocytopenia (HCC)        2. Anemia, unspecified type        3. Atrial fibrillation, unspecified type (HCC)        4. History of COPD        5. Hyperglobulinemia              Plan:      Terrence Torres M.D.

## 2023-03-28 ENCOUNTER — APPOINTMENT (OUTPATIENT)
Dept: HEMATOLOGY ONCOLOGY | Facility: MEDICAL CENTER | Age: 78
End: 2023-03-28
Payer: MEDICARE